# Patient Record
Sex: MALE | Race: WHITE | NOT HISPANIC OR LATINO | URBAN - METROPOLITAN AREA
[De-identification: names, ages, dates, MRNs, and addresses within clinical notes are randomized per-mention and may not be internally consistent; named-entity substitution may affect disease eponyms.]

---

## 2017-07-01 ENCOUNTER — OUTPATIENT (OUTPATIENT)
Dept: OUTPATIENT SERVICES | Facility: HOSPITAL | Age: 62
LOS: 1 days | End: 2017-07-01
Payer: MEDICAID

## 2017-07-17 DIAGNOSIS — R69 ILLNESS, UNSPECIFIED: ICD-10-CM

## 2017-08-01 ENCOUNTER — OUTPATIENT (OUTPATIENT)
Dept: OUTPATIENT SERVICES | Facility: HOSPITAL | Age: 62
LOS: 1 days | End: 2017-08-01

## 2018-02-07 DIAGNOSIS — R69 ILLNESS, UNSPECIFIED: ICD-10-CM

## 2018-05-01 PROCEDURE — G9005: CPT

## 2018-05-01 PROCEDURE — G9001: CPT

## 2018-07-01 ENCOUNTER — OUTPATIENT (OUTPATIENT)
Dept: OUTPATIENT SERVICES | Facility: HOSPITAL | Age: 63
LOS: 1 days | End: 2018-07-01
Payer: MEDICARE

## 2018-07-06 DIAGNOSIS — Z71.89 OTHER SPECIFIED COUNSELING: ICD-10-CM

## 2021-03-01 ENCOUNTER — INPATIENT (INPATIENT)
Facility: HOSPITAL | Age: 66
LOS: 6 days | Discharge: NURSING, REHAB OR LONG-TERM CARE | End: 2021-03-08
Attending: INTERNAL MEDICINE | Admitting: INTERNAL MEDICINE
Payer: MEDICARE

## 2021-03-01 VITALS
TEMPERATURE: 99 F | OXYGEN SATURATION: 99 % | HEART RATE: 130 BPM | DIASTOLIC BLOOD PRESSURE: 51 MMHG | HEIGHT: 72 IN | WEIGHT: 164.91 LBS | RESPIRATION RATE: 25 BRPM | SYSTOLIC BLOOD PRESSURE: 82 MMHG

## 2021-03-01 LAB
ALBUMIN SERPL ELPH-MCNC: 1.8 G/DL — LOW (ref 3.3–5)
ALP SERPL-CCNC: 170 U/L — HIGH (ref 40–120)
ALT FLD-CCNC: 30 U/L — SIGNIFICANT CHANGE UP (ref 12–78)
ANION GAP SERPL CALC-SCNC: 8 MMOL/L — SIGNIFICANT CHANGE UP (ref 5–17)
ANISOCYTOSIS BLD QL: SLIGHT — SIGNIFICANT CHANGE UP
APTT BLD: 20.7 SEC — LOW (ref 27.5–35.5)
AST SERPL-CCNC: 13 U/L — LOW (ref 15–37)
BASOPHILS # BLD AUTO: 0 K/UL — SIGNIFICANT CHANGE UP (ref 0–0.2)
BASOPHILS NFR BLD AUTO: 0 % — SIGNIFICANT CHANGE UP (ref 0–2)
BILIRUB SERPL-MCNC: 1.7 MG/DL — HIGH (ref 0.2–1.2)
BUN SERPL-MCNC: 16 MG/DL — SIGNIFICANT CHANGE UP (ref 7–23)
CALCIUM SERPL-MCNC: 7.3 MG/DL — LOW (ref 8.5–10.1)
CHLORIDE SERPL-SCNC: 94 MMOL/L — LOW (ref 96–108)
CO2 SERPL-SCNC: 35 MMOL/L — HIGH (ref 22–31)
CREAT SERPL-MCNC: 0.76 MG/DL — SIGNIFICANT CHANGE UP (ref 0.5–1.3)
D DIMER BLD IA.RAPID-MCNC: 825 NG/ML DDU — HIGH
ELLIPTOCYTES BLD QL SMEAR: SLIGHT — SIGNIFICANT CHANGE UP
EOSINOPHIL # BLD AUTO: 0 K/UL — SIGNIFICANT CHANGE UP (ref 0–0.5)
EOSINOPHIL NFR BLD AUTO: 0 % — SIGNIFICANT CHANGE UP (ref 0–6)
FLUAV AG NPH QL: SIGNIFICANT CHANGE UP
FLUBV AG NPH QL: SIGNIFICANT CHANGE UP
GLUCOSE SERPL-MCNC: 158 MG/DL — HIGH (ref 70–99)
HCT VFR BLD CALC: 43 % — SIGNIFICANT CHANGE UP (ref 39–50)
HGB BLD-MCNC: 13.3 G/DL — SIGNIFICANT CHANGE UP (ref 13–17)
INR BLD: 1.32 RATIO — HIGH (ref 0.88–1.16)
LACTATE SERPL-SCNC: 2.6 MMOL/L — HIGH (ref 0.7–2)
LYMPHOCYTES # BLD AUTO: 1.31 K/UL — SIGNIFICANT CHANGE UP (ref 1–3.3)
LYMPHOCYTES # BLD AUTO: 5 % — LOW (ref 13–44)
MACROCYTES BLD QL: SIGNIFICANT CHANGE UP
MANUAL SMEAR VERIFICATION: SIGNIFICANT CHANGE UP
MCHC RBC-ENTMCNC: 27.3 PG — SIGNIFICANT CHANGE UP (ref 27–34)
MCHC RBC-ENTMCNC: 30.9 GM/DL — LOW (ref 32–36)
MCV RBC AUTO: 88.3 FL — SIGNIFICANT CHANGE UP (ref 80–100)
MICROCYTES BLD QL: SLIGHT — SIGNIFICANT CHANGE UP
MONOCYTES # BLD AUTO: 0.52 K/UL — SIGNIFICANT CHANGE UP (ref 0–0.9)
MONOCYTES NFR BLD AUTO: 2 % — SIGNIFICANT CHANGE UP (ref 2–14)
NEUTROPHILS # BLD AUTO: 24.33 K/UL — HIGH (ref 1.8–7.4)
NEUTROPHILS NFR BLD AUTO: 88 % — HIGH (ref 43–77)
NEUTS BAND # BLD: 5 % — SIGNIFICANT CHANGE UP (ref 0–8)
NRBC # BLD: 0 /100 — SIGNIFICANT CHANGE UP (ref 0–0)
NRBC # BLD: SIGNIFICANT CHANGE UP /100 WBCS (ref 0–0)
NT-PROBNP SERPL-SCNC: 1428 PG/ML — HIGH (ref 0–125)
OVALOCYTES BLD QL SMEAR: SLIGHT — SIGNIFICANT CHANGE UP
PLAT MORPH BLD: NORMAL — SIGNIFICANT CHANGE UP
PLATELET # BLD AUTO: 180 K/UL — SIGNIFICANT CHANGE UP (ref 150–400)
POTASSIUM SERPL-MCNC: 3 MMOL/L — LOW (ref 3.5–5.3)
POTASSIUM SERPL-SCNC: 3 MMOL/L — LOW (ref 3.5–5.3)
PROT SERPL-MCNC: 4.4 GM/DL — LOW (ref 6–8.3)
PROTHROM AB SERPL-ACNC: 15.1 SEC — HIGH (ref 10.6–13.6)
RBC # BLD: 4.87 M/UL — SIGNIFICANT CHANGE UP (ref 4.2–5.8)
RBC # FLD: 21.2 % — HIGH (ref 10.3–14.5)
RBC BLD AUTO: ABNORMAL
SARS-COV-2 RNA SPEC QL NAA+PROBE: SIGNIFICANT CHANGE UP
SCHISTOCYTES BLD QL AUTO: SLIGHT — SIGNIFICANT CHANGE UP
SODIUM SERPL-SCNC: 137 MMOL/L — SIGNIFICANT CHANGE UP (ref 135–145)
TROPONIN I SERPL-MCNC: 0.03 NG/ML — SIGNIFICANT CHANGE UP (ref 0.01–0.04)
WBC # BLD: 26.16 K/UL — HIGH (ref 3.8–10.5)
WBC # FLD AUTO: 26.16 K/UL — HIGH (ref 3.8–10.5)

## 2021-03-01 PROCEDURE — 71275 CT ANGIOGRAPHY CHEST: CPT | Mod: 26,MC

## 2021-03-01 PROCEDURE — G9005: CPT

## 2021-03-01 PROCEDURE — 71045 X-RAY EXAM CHEST 1 VIEW: CPT | Mod: 26

## 2021-03-01 PROCEDURE — 99285 EMERGENCY DEPT VISIT HI MDM: CPT

## 2021-03-01 PROCEDURE — 76700 US EXAM ABDOM COMPLETE: CPT | Mod: 26

## 2021-03-01 PROCEDURE — 70450 CT HEAD/BRAIN W/O DYE: CPT | Mod: 26,MC

## 2021-03-01 RX ORDER — CHLORHEXIDINE GLUCONATE 213 G/1000ML
1 SOLUTION TOPICAL
Refills: 0 | Status: DISCONTINUED | OUTPATIENT
Start: 2021-03-01 | End: 2021-03-03

## 2021-03-01 RX ORDER — POTASSIUM CHLORIDE 20 MEQ
20 PACKET (EA) ORAL ONCE
Refills: 0 | Status: DISCONTINUED | OUTPATIENT
Start: 2021-03-01 | End: 2021-03-02

## 2021-03-01 RX ORDER — ACETAMINOPHEN 500 MG
975 TABLET ORAL ONCE
Refills: 0 | Status: COMPLETED | OUTPATIENT
Start: 2021-03-01 | End: 2021-03-01

## 2021-03-01 RX ORDER — CEFTRIAXONE 500 MG/1
1000 INJECTION, POWDER, FOR SOLUTION INTRAMUSCULAR; INTRAVENOUS ONCE
Refills: 0 | Status: COMPLETED | OUTPATIENT
Start: 2021-03-01 | End: 2021-03-01

## 2021-03-01 RX ORDER — VANCOMYCIN HCL 1 G
1000 VIAL (EA) INTRAVENOUS EVERY 8 HOURS
Refills: 0 | Status: DISCONTINUED | OUTPATIENT
Start: 2021-03-01 | End: 2021-03-04

## 2021-03-01 RX ORDER — AZITHROMYCIN 500 MG/1
500 TABLET, FILM COATED ORAL ONCE
Refills: 0 | Status: DISCONTINUED | OUTPATIENT
Start: 2021-03-01 | End: 2021-03-02

## 2021-03-01 RX ORDER — SODIUM CHLORIDE 9 MG/ML
1000 INJECTION INTRAMUSCULAR; INTRAVENOUS; SUBCUTANEOUS ONCE
Refills: 0 | Status: COMPLETED | OUTPATIENT
Start: 2021-03-01 | End: 2021-03-01

## 2021-03-01 RX ORDER — ALBUMIN HUMAN 25 %
100 VIAL (ML) INTRAVENOUS ONCE
Refills: 0 | Status: COMPLETED | OUTPATIENT
Start: 2021-03-01 | End: 2021-03-01

## 2021-03-01 RX ORDER — NOREPINEPHRINE BITARTRATE/D5W 8 MG/250ML
0.05 PLASTIC BAG, INJECTION (ML) INTRAVENOUS
Qty: 8 | Refills: 0 | Status: DISCONTINUED | OUTPATIENT
Start: 2021-03-01 | End: 2021-03-03

## 2021-03-01 RX ADMIN — Medication 125 MILLIGRAM(S): at 18:22

## 2021-03-01 RX ADMIN — CEFTRIAXONE 1000 MILLIGRAM(S): 500 INJECTION, POWDER, FOR SOLUTION INTRAMUSCULAR; INTRAVENOUS at 19:27

## 2021-03-01 RX ADMIN — SODIUM CHLORIDE 1000 MILLILITER(S): 9 INJECTION INTRAMUSCULAR; INTRAVENOUS; SUBCUTANEOUS at 19:02

## 2021-03-01 RX ADMIN — CEFTRIAXONE 100 MILLIGRAM(S): 500 INJECTION, POWDER, FOR SOLUTION INTRAMUSCULAR; INTRAVENOUS at 18:57

## 2021-03-01 RX ADMIN — SODIUM CHLORIDE 1000 MILLILITER(S): 9 INJECTION INTRAMUSCULAR; INTRAVENOUS; SUBCUTANEOUS at 18:20

## 2021-03-01 RX ADMIN — SODIUM CHLORIDE 1000 MILLILITER(S): 9 INJECTION INTRAMUSCULAR; INTRAVENOUS; SUBCUTANEOUS at 20:02

## 2021-03-01 RX ADMIN — Medication 7.01 MICROGRAM(S)/KG/MIN: at 19:43

## 2021-03-01 RX ADMIN — Medication 975 MILLIGRAM(S): at 18:20

## 2021-03-01 RX ADMIN — SODIUM CHLORIDE 1000 MILLILITER(S): 9 INJECTION INTRAMUSCULAR; INTRAVENOUS; SUBCUTANEOUS at 19:20

## 2021-03-01 NOTE — ED ADULT NURSE NOTE - NSIMPLEMENTINTERV_GEN_ALL_ED
Implemented All Fall with Harm Risk Interventions:  [place] to call system. Call bell, personal items and telephone within reach. Instruct patient to call for assistance. Room bathroom lighting operational. Non-slip footwear when patient is off stretcher. Physically safe environment: no spills, clutter or unnecessary equipment. Stretcher in lowest position, wheels locked, appropriate side rails in place. Provide visual cue, wrist band, yellow gown, etc. Monitor gait and stability. Monitor for mental status changes and reorient to person, place, and time. Review medications for side effects contributing to fall risk. Reinforce activity limits and safety measures with patient and family. Provide visual clues: red socks.

## 2021-03-01 NOTE — ED PROVIDER NOTE - CARE PLAN
Principal Discharge DX:	Sepsis, due to unspecified organism, unspecified whether acute organ dysfunction present   Principal Discharge DX:	Sepsis, due to unspecified organism, unspecified whether acute organ dysfunction present  Secondary Diagnosis:	Cholecystitis  Secondary Diagnosis:	Pneumonia of right lung due to infectious organism, unspecified part of lung  Secondary Diagnosis:	Urinary retention

## 2021-03-01 NOTE — ED PROVIDER NOTE - OBJECTIVE STATEMENT
64 y/o M with a PMHx of CHF, COPD, HLD and HTN was BIBEMS to the ED from Nursing home s/p syncope episode today. Per EMS, Nursing home staff stated that Patient synopsized and was experiencing agonal breathing. CO2 was noted to be 70 and BP was 50/40 at that time. Nursing home administered albuterol. On EMS stated that Patient's O2 sat ws 92% on CPAP and they did not administer any medication. In the ED, Patient is unsure what happened and does not remember passing out. Denies chest pain, abdominal pain, nausea, emesis, diarrhea, fever, COVID exposure or recent COVID vaccine.     PMH: as listed. No PSH. NKDA. 66 y/o M with a PMHx of CHF, COPD, HLD and HTN was BIBEMS to the ED from Nursing home s/p syncope episode today. Per EMS, Nursing home staff stated that Patient synopsized and was experiencing agonal breathing. SPO2 was noted to be 70 and BP was 50/40 at that time. Nursing home administered albuterol. On EMS stated that Patient's O2 sat ws 92% on CPAP and they did not administer any medication. In the ED, Patient is unsure what happened and does not remember passing out. Denies chest pain, abdominal pain, nausea, emesis, diarrhea, fever, COVID exposure or recent COVID vaccine.     PMH: as listed. No PSH. NKDA.

## 2021-03-01 NOTE — ED ADULT TRIAGE NOTE - CHIEF COMPLAINT QUOTE
pt BIBA with c/o SOB at nursing home and hypotensive in the field as per [facility] facility covid -, placed on CPAP in the field now saturation 99% +Rhonchi

## 2021-03-01 NOTE — ED PROVIDER NOTE - PROGRESS NOTE DETAILS
Pt requesting meal, took off BiPAP to eat, SPO2 90% RA. BP 60/30, pt resting comfortably, will start pressors. Sleeping, SPO2 98% on 4L NC. /60, attempting to wean pressors. kenn - sign out from dr garcia. sepsis, pna, cholecystitis. abx, russell. case dw surgery and icu. admit.

## 2021-03-01 NOTE — ED PROVIDER NOTE - PMH
CHF (congestive heart failure)    COPD (chronic obstructive pulmonary disease)    HLD (hyperlipidemia)    HTN (hypertension)

## 2021-03-01 NOTE — ED PROVIDER NOTE - SECONDARY DIAGNOSIS.
Urinary retention Pneumonia of right lung due to infectious organism, unspecified part of lung Cholecystitis

## 2021-03-01 NOTE — ED PROVIDER NOTE - CLINICAL SUMMARY MEDICAL DECISION MAKING FREE TEXT BOX
64 y/o M with a PMHx of HTN, COPD, CHF and HLD was BIBEMS from nursing home due to syncope and hypoxia. Patient is awake and alert on ED arrival. Vital sings WLN. Plan: place Patient on BiPAP, obtain CBC, CMP, Troponin, BMP, D-dimer, CT brain, CT angio chest, Coagulant panel, lactacid ECG, CXR, blood culture and reassess. 66 y/o M with a PMHx of HTN, COPD, CHF and HLD was BIBEMS from nursing home due to syncope and hypoxia. Patient is awake and alert on ED arrival. Plan: Place pt on BiPAP, obtain CBC, CMP, Troponin, BNP, D-dimer, CT brain, CT angio chest, Coag panel, lactate, ECG, CXR, blood cultures, UA/C and re-eval. W/u significant for: Leukocytosis to 26, lactate 2.6, COVID negative. D-dimer 825, BNP 1248. Pt requiring pressors d/t BP persistently 60/30.

## 2021-03-01 NOTE — ED ADULT NURSE NOTE - CHIEF COMPLAINT QUOTE
pt BIBA with c/o SOB at nursing home and hypotensive in the field as per [facility] facility covid -, placed on CPAP in the field now saturation 99% +Rhonchi

## 2021-03-01 NOTE — ED PROVIDER NOTE - PHYSICAL EXAMINATION
GEN: Awake, alert, interactive, NAD. Appears older than stated age.   HEAD AND NECK: NC/AT. Airway patent. Neck supple.   EYES:  Clear b/l. EOMI. PERRL.   ENT: Moist mucus membranes.   CARDIAC: Regular rate, regular rhythm. No evident pedal edema. No lower extremities edema.   RESP/CHEST: Normal respiratory effort with no use of accessory muscles or retractions. Clear throughout on auscultation. No coarse breath sounds throughout.   ABD: soft, non-distended, non-tender. No rebound, no guarding.   BACK: No midline spinal TTP. No CVAT.   EXTREMITIES: Moving all extremities with no apparent deformities.   SKIN: Warm, dry, intact normal color. No rash.   NEURO: AOx3, CN II-XII grossly intact, no focal deficits.   PSYCH: Appropriate mood and affect. GEN: Awake, alert, interactive, NAD. Appears older than stated age.   HEAD AND NECK: NC/AT. Airway patent. Neck supple.   EYES:  Clear b/l. EOMI. PERRL.   ENT: Moist mucus membranes.   CARDIAC: Regular rate, regular rhythm. No evident pedal edema. No lower extremities edema.   RESP/CHEST: Normal respiratory effort with no use of accessory muscles or retractions. Coarse breath sounds throughout.   ABD: Soft, non-distended, non-tender. No rebound, no guarding.   BACK: No midline spinal TTP. No CVAT.   EXTREMITIES: Moving all extremities with no apparent deformities.   SKIN: Warm, dry, intact normal color. No rash.   NEURO: AOx3, CN II-XII grossly intact, no focal deficits.   PSYCH: Appropriate mood and affect.

## 2021-03-02 LAB
ALBUMIN SERPL ELPH-MCNC: 1.8 G/DL — LOW (ref 3.3–5)
ALBUMIN SERPL ELPH-MCNC: 2.1 G/DL — LOW (ref 3.3–5)
ALBUMIN SERPL ELPH-MCNC: 2.3 G/DL — LOW (ref 3.3–5)
ALP SERPL-CCNC: 105 U/L — SIGNIFICANT CHANGE UP (ref 40–120)
ALP SERPL-CCNC: 134 U/L — HIGH (ref 40–120)
ALP SERPL-CCNC: 152 U/L — HIGH (ref 40–120)
ALT FLD-CCNC: 26 U/L — SIGNIFICANT CHANGE UP (ref 12–78)
ALT FLD-CCNC: 28 U/L — SIGNIFICANT CHANGE UP (ref 12–78)
ALT FLD-CCNC: 31 U/L — SIGNIFICANT CHANGE UP (ref 12–78)
ANION GAP SERPL CALC-SCNC: 4 MMOL/L — LOW (ref 5–17)
ANION GAP SERPL CALC-SCNC: 6 MMOL/L — SIGNIFICANT CHANGE UP (ref 5–17)
ANION GAP SERPL CALC-SCNC: 6 MMOL/L — SIGNIFICANT CHANGE UP (ref 5–17)
APPEARANCE UR: CLEAR — SIGNIFICANT CHANGE UP
AST SERPL-CCNC: 10 U/L — LOW (ref 15–37)
AST SERPL-CCNC: 4 U/L — LOW (ref 15–37)
AST SERPL-CCNC: 7 U/L — LOW (ref 15–37)
BACTERIA # UR AUTO: ABNORMAL
BASOPHILS # BLD AUTO: 0.11 K/UL — SIGNIFICANT CHANGE UP (ref 0–0.2)
BASOPHILS NFR BLD AUTO: 0.4 % — SIGNIFICANT CHANGE UP (ref 0–2)
BILIRUB SERPL-MCNC: 0.8 MG/DL — SIGNIFICANT CHANGE UP (ref 0.2–1.2)
BILIRUB SERPL-MCNC: 0.9 MG/DL — SIGNIFICANT CHANGE UP (ref 0.2–1.2)
BILIRUB SERPL-MCNC: 1 MG/DL — SIGNIFICANT CHANGE UP (ref 0.2–1.2)
BILIRUB UR-MCNC: NEGATIVE — SIGNIFICANT CHANGE UP
BUN SERPL-MCNC: 12 MG/DL — SIGNIFICANT CHANGE UP (ref 7–23)
BUN SERPL-MCNC: 14 MG/DL — SIGNIFICANT CHANGE UP (ref 7–23)
BUN SERPL-MCNC: 15 MG/DL — SIGNIFICANT CHANGE UP (ref 7–23)
CALCIUM SERPL-MCNC: 7.2 MG/DL — LOW (ref 8.5–10.1)
CALCIUM SERPL-MCNC: 7.3 MG/DL — LOW (ref 8.5–10.1)
CALCIUM SERPL-MCNC: 7.4 MG/DL — LOW (ref 8.5–10.1)
CHLORIDE SERPL-SCNC: 100 MMOL/L — SIGNIFICANT CHANGE UP (ref 96–108)
CHLORIDE SERPL-SCNC: 95 MMOL/L — LOW (ref 96–108)
CHLORIDE SERPL-SCNC: 99 MMOL/L — SIGNIFICANT CHANGE UP (ref 96–108)
CO2 SERPL-SCNC: 36 MMOL/L — HIGH (ref 22–31)
CO2 SERPL-SCNC: 36 MMOL/L — HIGH (ref 22–31)
CO2 SERPL-SCNC: 38 MMOL/L — HIGH (ref 22–31)
COLOR SPEC: YELLOW — SIGNIFICANT CHANGE UP
COMMENT - URINE: SIGNIFICANT CHANGE UP
CREAT SERPL-MCNC: 0.4 MG/DL — LOW (ref 0.5–1.3)
CREAT SERPL-MCNC: 0.62 MG/DL — SIGNIFICANT CHANGE UP (ref 0.5–1.3)
CREAT SERPL-MCNC: 0.69 MG/DL — SIGNIFICANT CHANGE UP (ref 0.5–1.3)
DIFF PNL FLD: ABNORMAL
EOSINOPHIL # BLD AUTO: 0 K/UL — SIGNIFICANT CHANGE UP (ref 0–0.5)
EOSINOPHIL NFR BLD AUTO: 0 % — SIGNIFICANT CHANGE UP (ref 0–6)
GGT SERPL-CCNC: 98 U/L — HIGH (ref 9–50)
GLUCOSE SERPL-MCNC: 142 MG/DL — HIGH (ref 70–99)
GLUCOSE SERPL-MCNC: 155 MG/DL — HIGH (ref 70–99)
GLUCOSE SERPL-MCNC: 199 MG/DL — HIGH (ref 70–99)
GLUCOSE UR QL: NEGATIVE MG/DL — SIGNIFICANT CHANGE UP
HCT VFR BLD CALC: 42.8 % — SIGNIFICANT CHANGE UP (ref 39–50)
HGB BLD-MCNC: 13.3 G/DL — SIGNIFICANT CHANGE UP (ref 13–17)
IMM GRANULOCYTES NFR BLD AUTO: 4.8 % — HIGH (ref 0–1.5)
KETONES UR-MCNC: NEGATIVE — SIGNIFICANT CHANGE UP
LACTATE SERPL-SCNC: 1.9 MMOL/L — SIGNIFICANT CHANGE UP (ref 0.7–2)
LEGIONELLA AG UR QL: NEGATIVE — SIGNIFICANT CHANGE UP
LEUKOCYTE ESTERASE UR-ACNC: NEGATIVE — SIGNIFICANT CHANGE UP
LIDOCAIN IGE QN: 32 U/L — LOW (ref 73–393)
LYMPHOCYTES # BLD AUTO: 1.04 K/UL — SIGNIFICANT CHANGE UP (ref 1–3.3)
LYMPHOCYTES # BLD AUTO: 4.1 % — LOW (ref 13–44)
MAGNESIUM SERPL-MCNC: 1.1 MG/DL — LOW (ref 1.6–2.6)
MAGNESIUM SERPL-MCNC: 2 MG/DL — SIGNIFICANT CHANGE UP (ref 1.6–2.6)
MAGNESIUM SERPL-MCNC: 2.1 MG/DL — SIGNIFICANT CHANGE UP (ref 1.6–2.6)
MCHC RBC-ENTMCNC: 27.9 PG — SIGNIFICANT CHANGE UP (ref 27–34)
MCHC RBC-ENTMCNC: 31.1 GM/DL — LOW (ref 32–36)
MCV RBC AUTO: 89.7 FL — SIGNIFICANT CHANGE UP (ref 80–100)
MONOCYTES # BLD AUTO: 0.27 K/UL — SIGNIFICANT CHANGE UP (ref 0–0.9)
MONOCYTES NFR BLD AUTO: 1.1 % — LOW (ref 2–14)
NEUTROPHILS # BLD AUTO: 22.79 K/UL — HIGH (ref 1.8–7.4)
NEUTROPHILS NFR BLD AUTO: 89.6 % — HIGH (ref 43–77)
NITRITE UR-MCNC: POSITIVE
NRBC # BLD: 0 /100 WBCS — SIGNIFICANT CHANGE UP (ref 0–0)
PH UR: 5 — SIGNIFICANT CHANGE UP (ref 5–8)
PHOSPHATE SERPL-MCNC: 1.3 MG/DL — LOW (ref 2.5–4.5)
PHOSPHATE SERPL-MCNC: 3 MG/DL — SIGNIFICANT CHANGE UP (ref 2.5–4.5)
PHOSPHATE SERPL-MCNC: 3.4 MG/DL — SIGNIFICANT CHANGE UP (ref 2.5–4.5)
PLATELET # BLD AUTO: 233 K/UL — SIGNIFICANT CHANGE UP (ref 150–400)
POTASSIUM SERPL-MCNC: 2.9 MMOL/L — CRITICAL LOW (ref 3.5–5.3)
POTASSIUM SERPL-MCNC: 3 MMOL/L — LOW (ref 3.5–5.3)
POTASSIUM SERPL-MCNC: 3.7 MMOL/L — SIGNIFICANT CHANGE UP (ref 3.5–5.3)
POTASSIUM SERPL-SCNC: 2.9 MMOL/L — CRITICAL LOW (ref 3.5–5.3)
POTASSIUM SERPL-SCNC: 3 MMOL/L — LOW (ref 3.5–5.3)
POTASSIUM SERPL-SCNC: 3.7 MMOL/L — SIGNIFICANT CHANGE UP (ref 3.5–5.3)
PROT SERPL-MCNC: 4.4 GM/DL — LOW (ref 6–8.3)
PROT SERPL-MCNC: 4.6 GM/DL — LOW (ref 6–8.3)
PROT SERPL-MCNC: 5.1 GM/DL — LOW (ref 6–8.3)
PROT UR-MCNC: NEGATIVE MG/DL — SIGNIFICANT CHANGE UP
RBC # BLD: 4.77 M/UL — SIGNIFICANT CHANGE UP (ref 4.2–5.8)
RBC # FLD: 20.9 % — HIGH (ref 10.3–14.5)
SODIUM SERPL-SCNC: 139 MMOL/L — SIGNIFICANT CHANGE UP (ref 135–145)
SODIUM SERPL-SCNC: 139 MMOL/L — SIGNIFICANT CHANGE UP (ref 135–145)
SODIUM SERPL-SCNC: 142 MMOL/L — SIGNIFICANT CHANGE UP (ref 135–145)
SP GR SPEC: 1 — LOW (ref 1.01–1.02)
UROBILINOGEN FLD QL: NEGATIVE MG/DL — SIGNIFICANT CHANGE UP
WBC # BLD: 25.42 K/UL — HIGH (ref 3.8–10.5)
WBC # FLD AUTO: 25.42 K/UL — HIGH (ref 3.8–10.5)
WBC UR QL: SIGNIFICANT CHANGE UP

## 2021-03-02 PROCEDURE — 99291 CRITICAL CARE FIRST HOUR: CPT

## 2021-03-02 PROCEDURE — 78226 HEPATOBILIARY SYSTEM IMAGING: CPT | Mod: 26

## 2021-03-02 PROCEDURE — 93010 ELECTROCARDIOGRAM REPORT: CPT

## 2021-03-02 PROCEDURE — 93306 TTE W/DOPPLER COMPLETE: CPT | Mod: 26

## 2021-03-02 RX ORDER — RISPERIDONE 4 MG/1
1 TABLET ORAL
Qty: 0 | Refills: 0 | DISCHARGE

## 2021-03-02 RX ORDER — DEXTROSE MONOHYDRATE, SODIUM CHLORIDE, AND POTASSIUM CHLORIDE 50; .745; 4.5 G/1000ML; G/1000ML; G/1000ML
1000 INJECTION, SOLUTION INTRAVENOUS
Refills: 0 | Status: DISCONTINUED | OUTPATIENT
Start: 2021-03-02 | End: 2021-03-02

## 2021-03-02 RX ORDER — EPINEPHRINE 0.3 MG/.3ML
3 INJECTION INTRAMUSCULAR; SUBCUTANEOUS
Qty: 0 | Refills: 0 | DISCHARGE

## 2021-03-02 RX ORDER — PIPERACILLIN AND TAZOBACTAM 4; .5 G/20ML; G/20ML
3.38 INJECTION, POWDER, LYOPHILIZED, FOR SOLUTION INTRAVENOUS ONCE
Refills: 0 | Status: COMPLETED | OUTPATIENT
Start: 2021-03-02 | End: 2021-03-02

## 2021-03-02 RX ORDER — POTASSIUM CHLORIDE 20 MEQ
40 PACKET (EA) ORAL ONCE
Refills: 0 | Status: COMPLETED | OUTPATIENT
Start: 2021-03-02 | End: 2021-03-02

## 2021-03-02 RX ORDER — HYDROMORPHONE HYDROCHLORIDE 2 MG/ML
1 INJECTION INTRAMUSCULAR; INTRAVENOUS; SUBCUTANEOUS EVERY 4 HOURS
Refills: 0 | Status: DISCONTINUED | OUTPATIENT
Start: 2021-03-02 | End: 2021-03-03

## 2021-03-02 RX ORDER — METRONIDAZOLE 500 MG
500 TABLET ORAL ONCE
Refills: 0 | Status: COMPLETED | OUTPATIENT
Start: 2021-03-02 | End: 2021-03-02

## 2021-03-02 RX ORDER — IPRATROPIUM/ALBUTEROL SULFATE 18-103MCG
3 AEROSOL WITH ADAPTER (GRAM) INHALATION EVERY 6 HOURS
Refills: 0 | Status: DISCONTINUED | OUTPATIENT
Start: 2021-03-02 | End: 2021-03-03

## 2021-03-02 RX ORDER — POTASSIUM CHLORIDE 20 MEQ
10 PACKET (EA) ORAL
Refills: 0 | Status: COMPLETED | OUTPATIENT
Start: 2021-03-02 | End: 2021-03-02

## 2021-03-02 RX ORDER — HYDROCORTISONE 20 MG
50 TABLET ORAL EVERY 12 HOURS
Refills: 0 | Status: DISCONTINUED | OUTPATIENT
Start: 2021-03-02 | End: 2021-03-03

## 2021-03-02 RX ORDER — AZITHROMYCIN 500 MG/1
500 TABLET, FILM COATED ORAL ONCE
Refills: 0 | Status: COMPLETED | OUTPATIENT
Start: 2021-03-02 | End: 2021-03-02

## 2021-03-02 RX ORDER — LACTOBACILLUS ACIDOPHILUS 100MM CELL
1 CAPSULE ORAL DAILY
Refills: 0 | Status: DISCONTINUED | OUTPATIENT
Start: 2021-03-02 | End: 2021-03-08

## 2021-03-02 RX ORDER — CEFEPIME 1 G/1
2000 INJECTION, POWDER, FOR SOLUTION INTRAMUSCULAR; INTRAVENOUS EVERY 8 HOURS
Refills: 0 | Status: DISCONTINUED | OUTPATIENT
Start: 2021-03-02 | End: 2021-03-07

## 2021-03-02 RX ORDER — METRONIDAZOLE 500 MG
500 TABLET ORAL EVERY 8 HOURS
Refills: 0 | Status: DISCONTINUED | OUTPATIENT
Start: 2021-03-02 | End: 2021-03-02

## 2021-03-02 RX ORDER — POTASSIUM CHLORIDE 20 MEQ
10 PACKET (EA) ORAL ONCE
Refills: 0 | Status: DISCONTINUED | OUTPATIENT
Start: 2021-03-02 | End: 2021-03-02

## 2021-03-02 RX ORDER — AZITHROMYCIN 500 MG/1
TABLET, FILM COATED ORAL
Refills: 0 | Status: DISCONTINUED | OUTPATIENT
Start: 2021-03-02 | End: 2021-03-02

## 2021-03-02 RX ORDER — ENOXAPARIN SODIUM 100 MG/ML
40 INJECTION SUBCUTANEOUS DAILY
Refills: 0 | Status: DISCONTINUED | OUTPATIENT
Start: 2021-03-02 | End: 2021-03-08

## 2021-03-02 RX ORDER — KETOROLAC TROMETHAMINE 30 MG/ML
30 SYRINGE (ML) INJECTION EVERY 6 HOURS
Refills: 0 | Status: DISCONTINUED | OUTPATIENT
Start: 2021-03-02 | End: 2021-03-03

## 2021-03-02 RX ORDER — DIPHENHYDRAMINE HCL 50 MG
25 CAPSULE ORAL ONCE
Refills: 0 | Status: COMPLETED | OUTPATIENT
Start: 2021-03-02 | End: 2021-03-02

## 2021-03-02 RX ORDER — SODIUM,POTASSIUM PHOSPHATES 278-250MG
2 POWDER IN PACKET (EA) ORAL
Refills: 0 | Status: COMPLETED | OUTPATIENT
Start: 2021-03-02 | End: 2021-03-04

## 2021-03-02 RX ORDER — ONDANSETRON 8 MG/1
4 TABLET, FILM COATED ORAL EVERY 6 HOURS
Refills: 0 | Status: DISCONTINUED | OUTPATIENT
Start: 2021-03-02 | End: 2021-03-08

## 2021-03-02 RX ORDER — PANTOPRAZOLE SODIUM 20 MG/1
40 TABLET, DELAYED RELEASE ORAL DAILY
Refills: 0 | Status: DISCONTINUED | OUTPATIENT
Start: 2021-03-02 | End: 2021-03-08

## 2021-03-02 RX ORDER — METRONIDAZOLE 500 MG
TABLET ORAL
Refills: 0 | Status: DISCONTINUED | OUTPATIENT
Start: 2021-03-02 | End: 2021-03-02

## 2021-03-02 RX ORDER — HYDROCORTISONE 20 MG
1 TABLET ORAL
Qty: 0 | Refills: 0 | DISCHARGE

## 2021-03-02 RX ORDER — IPRATROPIUM/ALBUTEROL SULFATE 18-103MCG
3 AEROSOL WITH ADAPTER (GRAM) INHALATION ONCE
Refills: 0 | Status: COMPLETED | OUTPATIENT
Start: 2021-03-02 | End: 2021-03-02

## 2021-03-02 RX ORDER — FLUTICASONE PROPIONATE AND SALMETEROL 50; 250 UG/1; UG/1
1 POWDER ORAL; RESPIRATORY (INHALATION)
Qty: 0 | Refills: 0 | DISCHARGE

## 2021-03-02 RX ORDER — ATORVASTATIN CALCIUM 80 MG/1
80 TABLET, FILM COATED ORAL AT BEDTIME
Refills: 0 | Status: DISCONTINUED | OUTPATIENT
Start: 2021-03-02 | End: 2021-03-08

## 2021-03-02 RX ORDER — CALCIUM CARBONATE 500(1250)
1 TABLET ORAL
Qty: 0 | Refills: 0 | DISCHARGE

## 2021-03-02 RX ORDER — ALBUTEROL 90 UG/1
2.5 AEROSOL, METERED ORAL ONCE
Refills: 0 | Status: DISCONTINUED | OUTPATIENT
Start: 2021-03-02 | End: 2021-03-02

## 2021-03-02 RX ORDER — MAGNESIUM SULFATE 500 MG/ML
2 VIAL (ML) INJECTION
Refills: 0 | Status: COMPLETED | OUTPATIENT
Start: 2021-03-02 | End: 2021-03-02

## 2021-03-02 RX ORDER — CEFEPIME 1 G/1
2000 INJECTION, POWDER, FOR SOLUTION INTRAMUSCULAR; INTRAVENOUS ONCE
Refills: 0 | Status: COMPLETED | OUTPATIENT
Start: 2021-03-02 | End: 2021-03-02

## 2021-03-02 RX ORDER — CEFEPIME 1 G/1
INJECTION, POWDER, FOR SOLUTION INTRAMUSCULAR; INTRAVENOUS
Refills: 0 | Status: DISCONTINUED | OUTPATIENT
Start: 2021-03-02 | End: 2021-03-07

## 2021-03-02 RX ORDER — POTASSIUM PHOSPHATE, MONOBASIC POTASSIUM PHOSPHATE, DIBASIC 236; 224 MG/ML; MG/ML
15 INJECTION, SOLUTION INTRAVENOUS ONCE
Refills: 0 | Status: COMPLETED | OUTPATIENT
Start: 2021-03-02 | End: 2021-03-02

## 2021-03-02 RX ORDER — MIDODRINE HYDROCHLORIDE 2.5 MG/1
10 TABLET ORAL EVERY 8 HOURS
Refills: 0 | Status: DISCONTINUED | OUTPATIENT
Start: 2021-03-02 | End: 2021-03-08

## 2021-03-02 RX ORDER — ATORVASTATIN CALCIUM 80 MG/1
1 TABLET, FILM COATED ORAL
Qty: 0 | Refills: 0 | DISCHARGE

## 2021-03-02 RX ADMIN — MIDODRINE HYDROCHLORIDE 10 MILLIGRAM(S): 2.5 TABLET ORAL at 21:57

## 2021-03-02 RX ADMIN — Medication 50 MILLIGRAM(S): at 05:40

## 2021-03-02 RX ADMIN — Medication 2 TABLET(S): at 17:00

## 2021-03-02 RX ADMIN — DEXTROSE MONOHYDRATE, SODIUM CHLORIDE, AND POTASSIUM CHLORIDE 60 MILLILITER(S): 50; .745; 4.5 INJECTION, SOLUTION INTRAVENOUS at 06:00

## 2021-03-02 RX ADMIN — Medication 100 MILLIEQUIVALENT(S): at 06:02

## 2021-03-02 RX ADMIN — Medication 100 MILLIGRAM(S): at 06:38

## 2021-03-02 RX ADMIN — Medication 250 MILLIGRAM(S): at 08:43

## 2021-03-02 RX ADMIN — Medication 100 MILLIEQUIVALENT(S): at 07:04

## 2021-03-02 RX ADMIN — Medication 50 MILLIGRAM(S): at 17:00

## 2021-03-02 RX ADMIN — ENOXAPARIN SODIUM 40 MILLIGRAM(S): 100 INJECTION SUBCUTANEOUS at 13:54

## 2021-03-02 RX ADMIN — Medication 250 MILLIGRAM(S): at 16:11

## 2021-03-02 RX ADMIN — Medication 100 MILLIEQUIVALENT(S): at 10:21

## 2021-03-02 RX ADMIN — Medication 50 GRAM(S): at 04:11

## 2021-03-02 RX ADMIN — Medication 250 MILLIGRAM(S): at 21:57

## 2021-03-02 RX ADMIN — PANTOPRAZOLE SODIUM 40 MILLIGRAM(S): 20 TABLET, DELAYED RELEASE ORAL at 13:56

## 2021-03-02 RX ADMIN — CHLORHEXIDINE GLUCONATE 1 APPLICATION(S): 213 SOLUTION TOPICAL at 13:56

## 2021-03-02 RX ADMIN — AZITHROMYCIN 255 MILLIGRAM(S): 500 TABLET, FILM COATED ORAL at 02:36

## 2021-03-02 RX ADMIN — POTASSIUM PHOSPHATE, MONOBASIC POTASSIUM PHOSPHATE, DIBASIC 62.5 MILLIMOLE(S): 236; 224 INJECTION, SOLUTION INTRAVENOUS at 17:00

## 2021-03-02 RX ADMIN — Medication 100 MILLIEQUIVALENT(S): at 12:53

## 2021-03-02 RX ADMIN — Medication 40 MILLIEQUIVALENT(S): at 08:50

## 2021-03-02 RX ADMIN — Medication 100 MILLIEQUIVALENT(S): at 04:55

## 2021-03-02 RX ADMIN — CEFEPIME 100 MILLIGRAM(S): 1 INJECTION, POWDER, FOR SOLUTION INTRAMUSCULAR; INTRAVENOUS at 21:57

## 2021-03-02 RX ADMIN — CEFEPIME 100 MILLIGRAM(S): 1 INJECTION, POWDER, FOR SOLUTION INTRAMUSCULAR; INTRAVENOUS at 06:01

## 2021-03-02 RX ADMIN — PIPERACILLIN AND TAZOBACTAM 200 GRAM(S): 4; .5 INJECTION, POWDER, LYOPHILIZED, FOR SOLUTION INTRAVENOUS at 04:10

## 2021-03-02 RX ADMIN — Medication 50 MILLILITER(S): at 00:38

## 2021-03-02 RX ADMIN — Medication 3 MILLILITER(S): at 05:26

## 2021-03-02 RX ADMIN — Medication 7.01 MICROGRAM(S)/KG/MIN: at 10:48

## 2021-03-02 RX ADMIN — Medication 25 MILLIGRAM(S): at 05:40

## 2021-03-02 RX ADMIN — CEFEPIME 100 MILLIGRAM(S): 1 INJECTION, POWDER, FOR SOLUTION INTRAMUSCULAR; INTRAVENOUS at 13:54

## 2021-03-02 RX ADMIN — Medication 50 GRAM(S): at 05:05

## 2021-03-02 RX ADMIN — ATORVASTATIN CALCIUM 80 MILLIGRAM(S): 80 TABLET, FILM COATED ORAL at 21:57

## 2021-03-02 RX ADMIN — Medication 100 MILLIEQUIVALENT(S): at 08:43

## 2021-03-02 NOTE — H&P ADULT - ATTENDING COMMENTS
65M PMH CAD, HFpEF,  HLD, HTN, COPD, Nicotine dependence , GERD, crohn's s/p colectomy, prior alcohol use, ? psychiatry d/o was BIBEMS s/p syncopal episode today with agonal breathing at nursing home. Per EMS pt at nursing home was hypoxic to the 70s and hypotensive BP was 50/40. Per nursing home documentation pt had just completed a course of moxifloxacin X  5 days and was prescribed meropenum and vancomycin on the day of admission. Pt in ED febrile to 101 with complaints of chills. WBC 67/35. s/p 2L IVF bolus and albumin without improvement in BP. Started on levophed for hemodynamic support. Lactate 2.6. Admit to ICU for severe sepsis with septic shock, PNA, hypoxia.     1. NEURO  - awake, alert, oriented    2. CV  - septic shock likely due to underlying PNA  - cont with levophed for BP support  - weaning down levophed dose  - can add midodrine in attempt to help wean off IV pressors  - on stress dose steroids as pt ?chronically on steroids for underlying crohn's/COPD    3. PULM  - HCAP: cover for nosocomial infections as pt is from nursing facility  - cont with vanco and cefepime  - check sputum cx if able to expectorate and provide sputum sample  - check MRSA nares (if negative can d/c vanco as long as cultures also without staph)  - o2 supplementation  - CTA chest without PE, + R infiltrate  - duonebs as needed for underlying COPD    4. GI  - noted with GB wall edema and pericholecystic fluid: abdominal exam however benign without any tenderness or Roche's sign  - lab values also don't really reflect GB process severe enough to cause sepsis: mild transaminitis, with initial mild bump in T bili which has normalized  - will obtain HIDA to assess for acute shefali, if negative will start PO diet  - surgery on board    5. ID  - sepsis likely due to pulmonary source  - check blood cx, check u cx  - if able to check sputum cx  - check urine legionella  - cont vanco/cefepime covering HCAP, gram negative organisms  - if MRSA nares negative and cultures negative for staph will d/c vanco  - on flagyl covering possible abdominal source infection though low on ddx, if HIDA negative will d/c flagyl  - pt had reaction to zosyn: skin flushing, SOB: treated with benadryl with improvement  - tolerating cefepime    6. GEN  - monitor in ICU while on vasopressor  - physical therapy    Critical Care Time: 45 min

## 2021-03-02 NOTE — H&P ADULT - ASSESSMENT
Care After Your Injection  Dimitrios Mcqueen MD  [phone] / [phone]    Procedure    Lumbar Epidural Steroid Injection    Activity  • Do Not work, stay alone, drive a car, operate machinery or electrical equipment (including kitchen appliances) today.   • Activity as tolerated today.  • Resume your pre-procedure activity or restrictions tomorrow.     Dressing/Injection Site:  • Keep injection site clean and dry.   • You may use an ice pack on and off over the injection site for the next 24 hours while awake.    Bathing  • You may shower/bathe tomorrow.    Diet  • Resume your pre-procedure diet.      Medication  • Continue home medications, unless otherwise instructed.    Special Instructions  • DO NOT DRINK ALCOHOL TODAY due to the medication given during the procedure.    Call Dr. Mcqueen [place] office at [phone] or [phone] if you have the following:  • Drainage, increase in redness and/or swelling from the injection site.  • Temperature above 101 degrees.   • Numbness or weakness of your legs or arms different than before the injection.  • Severe headache.    Follow -up Instructions:    You have been scheduled for a follow up appointment with CRIS Patricio on September 20th at 1:00 p.m. at [place] [address] Location - [address] [place] [address] on [facility]    • For patients who had Epidural Steroid Injections: If within a week of the injection you notice significant relief, but not complete relief of pain, a second injection may be scheduled.  The second injection may be scheduled approximately 1 month after the first one.      Patient and family verbalize discharge instructions prior to procedure with nurse.    ___________________________________________________________________  Patient/Responsible Party Signature           RN Signature                   Date    
Assessment   ========    64 y/o M with a PMHx of CHF, COPD, HLD and HTN was BIBEMS to the ED from Nursing home s/p syncope episode today.   In ER patient remained hypotensive, started on vasopressor agents.   US showed gallbladder with wall thickening concerning for cholecystitis     Plan:  =====    ##Neuro:   -neuro check per routine    ##Cardiac  - hold hypertensive medications   - continue levophed titrate for MAP> 65      ##Pulm  -COPD  -continue albuterol/ atrovent MDI     -CT Chest R lower lobe infiltrate  - continue ABX for possible CAP  -check MRSA PRC  -urine for legionella  -continue O2 supplementation       ##GI  -US --> shows Gallbladder wall thickening  - HIDA  - Sx Consult   - keep NPO      ##Renal  -maintain urine output > .5cc/ kg   -monitor I&O     ##Gu   -maintain russell     ##Endo  - monitor finger sticks Q6 while NPO   -sliding scale insulin     ##ID  - trend lactate   - continue broad spectrum ABX -  - Pan culture - pending   - S/P 2 L fluid resuscitation     ##MUSC  - PT/OT consult

## 2021-03-02 NOTE — CONSULT NOTE ADULT - SUBJECTIVE AND OBJECTIVE BOX
Chief Complaint:  Patient is a 65y old  Male who presents with a chief complaint of difficulty breathing.     HPI: 64 y/o M PMH CHF, COPD, HTN, HLD, Crohns, BIBEMS s/p syncope, respiratory distress. Pt reportedly was saturating at 70%, then placed on BIPAP, with improvement in breathing and saturations. Patient found to be hypotensive in ED, s/p bolus x 2, albumin. Started on pressors.     Surgery consulted for US findings suggesting acute cholecystitis.   Patient seen and examined at bedside in ED.  Levophed running. BP and HR stable.   Patient sleeping, easily arousable.   Pt reports difficulty breathing since this morning at his nursing home. Then the next memory he has he was in the ER.  Patient denies abdominal pain, nausea, vomiting. Reports his last BM was this morning and was "my usual diarrhea from my Crohns."  Pt has hx of CHF. COPD, HTN, HLD, Crohns w/ hx of large bowel resection >30 yrs ago, hx of umbilical hernia repair.     PMH/PSH:PAST MEDICAL & SURGICAL HISTORY:  HLD (hyperlipidemia)  CHF (congestive heart failure)  COPD (chronic obstructive pulmonary disease)  HTN (hypertension)  Crohns (resection)  No significant past surgical history        Allergies:  No Known Allergies      Medications:  azithromycin  IVPB      azithromycin  IVPB 500 milliGRAM(s) IV Intermittent once  chlorhexidine 4% Liquid 1 Application(s) Topical <User Schedule>  enoxaparin Injectable 40 milliGRAM(s) SubCutaneous daily  norepinephrine Infusion 0.05 MICROgram(s)/kG/Min IV Continuous <Continuous>  piperacillin/tazobactam IVPB. 3.375 Gram(s) IV Intermittent Once  potassium chloride  20 mEq/100 mL IVPB 20 milliEquivalent(s) IV Intermittent once  vancomycin  IVPB 1000 milliGRAM(s) IV Intermittent every 8 hours      REVIEW OF SYSTEMS:  All other review of systems is negative unless indicated above.    Relevant Family History:   FAMILY HISTORY:      Relevant Social History:  Denies ETOH or tobacco history    Physical Exam:    Vital Signs:  Vital Signs Last 24 Hrs  T(C): 36.8 (02 Mar 2021 00:33), Max: 38.3 (01 Mar 2021 17:41)  T(F): 98.3 (02 Mar 2021 00:33), Max: 101 (01 Mar 2021 17:41)  HR: 90 (02 Mar 2021 00:33) (86 - 130)  BP: 103/60 (02 Mar 2021 00:33) (67/35 - 112/67)  RR: 16 (02 Mar 2021 00:33) (14 - 25)  SpO2: 98% (02 Mar 2021 00:33) (95% - 99%)      Constitutional: Sleeping supine in bed comfortably   HEENT: NC/AT, PERRL, anicteric sclera  Respiratory: 6L NC; respirations nonlabored   Cardiac: RRR   Gastrointestinal: old well-healed scar to LLQ, soft, nondistended, no tenderness to palpation, no rebound or guarding, -Roche's   Skin: warm, dry and intact; no rashes, wounds, or scars  Neurologic: AAOx3; CNS grossly intact    Laboratory:             13.3   26.16 )-----------( 180      ( 01 Mar 2021 17:37 )             43.0     03-01    137  |  94<L>  |  16  ----------------------------<  158<H>  3.0<L>   |  35<H>  |  0.76    Ca    7.3<L>      01 Mar 2021 17:37    TPro  4.4<L>  /  Alb  1.8<L>  /  TBili  1.7<H>  /  DBili  x   /  AST  13<L>  /  ALT  30  /  AlkPhos  170<H>  03-01    LIVER FUNCTIONS - ( 01 Mar 2021 17:37 )  Alb: 1.8 g/dL / Pro: 4.4 gm/dL / ALK PHOS: 170 U/L / ALT: 30 U/L / AST: 13 U/L / GGT: x           PT/INR - ( 01 Mar 2021 17:37 )   PT: 15.1 sec;   INR: 1.32 ratio    PTT - ( 01 Mar 2021 17:37 )  PTT:20.7 sec    Imaging:    < from: CT Angio Chest w/ IV Cont (03.01.21 @ 22:47) >  EXAM:  CT ANGIO CHEST (W)AW IC                            PROCEDURE DATE:  03/01/2021          INTERPRETATION:  CLINICAL INFORMATION: Syncope and hypoxia    COMPARISON: None.    PROCEDURE:  CT Angiography of the Chest.  90 ml of Omnipaque 350 was injected intravenously. 10 ml were discarded.  Sagittal and coronal reformats were performed as well as 3D (MIP) reconstructions.    FINDINGS:    LUNGS AND AIRWAYS: Patent central airways.  Severe diffuse emphysematous changes. Patchy right upper lobe consolidation. Right lower lobe partial atelectasis, and streaky bibasilar atelectasis.  PLEURA: Trace right pleural effusion.  MEDIASTINUM AND MAXIMILIAN: No lymphadenopathy.  VESSELS: No evidence of pulmonary embolism. No evidence of dissection.  HEART: Mild cardiomegaly, with right atrial and right ventricular enlargement. No pericardial effusion. Coronary artery calcifications.  CHEST WALL AND LOWER NECK: Within normal limits.  VISUALIZED UPPER ABDOMEN: Small hiatal hernia.  BONES: Degenerative changes.    IMPRESSION:  No evidence of pulmonary embolism. No evidence of dissection.    Right upper lobe pneumonia. Severe emphysema.      GEORGINA WORRELL MD; Attending Radiologist  This document has been electronically signed. Mar  1 2021 11:18PM    < end of copied text >      < from: US Abdomen Complete (US Abdomen Complete .) (03.01.21 @ 23:47) >  EXAM:  US ABDOMINAL COMPLETE                            PROCEDURE DATE:  03/01/2021      INTERPRETATION:  CLINICAL INFORMATION: Sepsis. 64 y/o M with a PMHx of CHF, COPD, HLD and HTN was BIBEMS to the ED from Nursing home s/p syncope episode today. Per EMS, Nursing home staff stated that Patient synopsized and was experiencing agonal breathing. SPO2 was noted to be 70 and BP was 50/40 at that time. Nursing home administered albuterol. On EMS stated that Patient's O2 sat ws 92% on CPAP and  they did not administer any medication. In the ED, Patient is unsure what happened and does not remember passing out.    COMPARISON: CT abdomen pelvis from 3/1/2021 10:48 PM    TECHNIQUE: Sonography of the abdomen.    FINDINGS:    Liver: Within normallimits.  Bile ducts: Normal caliber. Common bile duct measures 3.  Gallbladder: Gallstone. Wall thickening and trace pericholecystic fluid. The sonographic Roche's sign was reported.  Pancreas: Visualized portions are within normal limits.  Spleen: 10.8. Within normal limits.  Right kidney: 13.0. Moderate hydronephrosis.  Left kidney: 13.0.  Moderate hydronephrosis.  Ascites: None.  Aorta and IVC: Visualized portions are within normal limits.    IMPRESSION:  Gallstone with wall thickening and pericholecystic fluid which, in the appropriate clinical setting, these is a concern for acute cholecystitis. Consider further evaluation with nuclear medicine DISIDA scan which is more sensitive for the detection of such diagnosis.    Moderate bilateral hydronephrosis which may be related to a distended urinary bladder. Please correlate clinically with urinalysis and urine culture.      GRANT SCHILLING MD; Attending Radiologist  This document has been electronically signed. Mar  1 2021 11:59PM    < end of copied text >   Chief Complaint:  Patient is a 65y old  Male who presents with a chief complaint of difficulty breathing.     HPI: 66 y/o M PMH CHF, COPD, HTN, HLD, Crohns, BIBEMS s/p syncope, respiratory distress. Pt reportedly was saturating at 70%, then placed on BIPAP, with improvement in breathing and saturations. Patient found to be hypotensive in ED, s/p bolus x 2, albumin. Started on pressors.     Surgery consulted for US findings suggesting acute cholecystitis.   Patient seen and examined at bedside in ED.  Levophed running. BP and HR stable.   Patient sleeping, easily arousable.   Pt reports difficulty breathing since this morning at his nursing home. Patient does not recall how he got to ED.   Patient denies abdominal pain, nausea, vomiting. Reports his last BM was this morning and was "my usual diarrhea from my Crohns."  Pt has hx of CHF. COPD, HTN, HLD, Crohns w/ hx of large bowel resection >30 yrs ago, hx of umbilical hernia repair.     PMH/PSH:PAST MEDICAL & SURGICAL HISTORY:  HLD (hyperlipidemia)  CHF (congestive heart failure)  COPD (chronic obstructive pulmonary disease)  HTN (hypertension)  Crohns (resection)  No significant past surgical history        Allergies:  No Known Allergies      Medications:  azithromycin  IVPB      azithromycin  IVPB 500 milliGRAM(s) IV Intermittent once  chlorhexidine 4% Liquid 1 Application(s) Topical <User Schedule>  enoxaparin Injectable 40 milliGRAM(s) SubCutaneous daily  norepinephrine Infusion 0.05 MICROgram(s)/kG/Min IV Continuous <Continuous>  piperacillin/tazobactam IVPB. 3.375 Gram(s) IV Intermittent Once  potassium chloride  20 mEq/100 mL IVPB 20 milliEquivalent(s) IV Intermittent once  vancomycin  IVPB 1000 milliGRAM(s) IV Intermittent every 8 hours      REVIEW OF SYSTEMS:  All other review of systems is negative unless indicated above.    Relevant Family History:   FAMILY HISTORY:      Relevant Social History:  Denies ETOH or tobacco history    Physical Exam:    Vital Signs:  Vital Signs Last 24 Hrs  T(C): 36.8 (02 Mar 2021 00:33), Max: 38.3 (01 Mar 2021 17:41)  T(F): 98.3 (02 Mar 2021 00:33), Max: 101 (01 Mar 2021 17:41)  HR: 90 (02 Mar 2021 00:33) (86 - 130)  BP: 103/60 (02 Mar 2021 00:33) (67/35 - 112/67)  RR: 16 (02 Mar 2021 00:33) (14 - 25)  SpO2: 98% (02 Mar 2021 00:33) (95% - 99%)      Constitutional: Sleeping supine in bed comfortably   HEENT: NC/AT, PERRL, anicteric sclera  Respiratory: 6L NC; respirations nonlabored   Cardiac: RRR   Gastrointestinal: old well-healed scar to LLQ, soft, nondistended, no tenderness to palpation, no rebound or guarding, -Roche's   Skin: warm, dry and intact; no rashes, wounds, or scars  Neurologic: AAOx3; CNS grossly intact    Laboratory:             13.3   26.16 )-----------( 180      ( 01 Mar 2021 17:37 )             43.0     03-01    137  |  94<L>  |  16  ----------------------------<  158<H>  3.0<L>   |  35<H>  |  0.76    Ca    7.3<L>      01 Mar 2021 17:37    TPro  4.4<L>  /  Alb  1.8<L>  /  TBili  1.7<H>  /  DBili  x   /  AST  13<L>  /  ALT  30  /  AlkPhos  170<H>  03-01    LIVER FUNCTIONS - ( 01 Mar 2021 17:37 )  Alb: 1.8 g/dL / Pro: 4.4 gm/dL / ALK PHOS: 170 U/L / ALT: 30 U/L / AST: 13 U/L / GGT: x           PT/INR - ( 01 Mar 2021 17:37 )   PT: 15.1 sec;   INR: 1.32 ratio    PTT - ( 01 Mar 2021 17:37 )  PTT:20.7 sec    Imaging:    < from: CT Angio Chest w/ IV Cont (03.01.21 @ 22:47) >  EXAM:  CT ANGIO CHEST (W)AW IC                            PROCEDURE DATE:  03/01/2021          INTERPRETATION:  CLINICAL INFORMATION: Syncope and hypoxia    COMPARISON: None.    PROCEDURE:  CT Angiography of the Chest.  90 ml of Omnipaque 350 was injected intravenously. 10 ml were discarded.  Sagittal and coronal reformats were performed as well as 3D (MIP) reconstructions.    FINDINGS:    LUNGS AND AIRWAYS: Patent central airways.  Severe diffuse emphysematous changes. Patchy right upper lobe consolidation. Right lower lobe partial atelectasis, and streaky bibasilar atelectasis.  PLEURA: Trace right pleural effusion.  MEDIASTINUM AND MAXIMILIAN: No lymphadenopathy.  VESSELS: No evidence of pulmonary embolism. No evidence of dissection.  HEART: Mild cardiomegaly, with right atrial and right ventricular enlargement. No pericardial effusion. Coronary artery calcifications.  CHEST WALL AND LOWER NECK: Within normal limits.  VISUALIZED UPPER ABDOMEN: Small hiatal hernia.  BONES: Degenerative changes.    IMPRESSION:  No evidence of pulmonary embolism. No evidence of dissection.    Right upper lobe pneumonia. Severe emphysema.      GEORGINA WORRELL MD; Attending Radiologist  This document has been electronically signed. Mar  1 2021 11:18PM    < end of copied text >      < from: US Abdomen Complete (US Abdomen Complete .) (03.01.21 @ 23:47) >  EXAM:  US ABDOMINAL COMPLETE                            PROCEDURE DATE:  03/01/2021      INTERPRETATION:  CLINICAL INFORMATION: Sepsis. 66 y/o M with a PMHx of CHF, COPD, HLD and HTN was BIBEMS to the ED from Nursing home s/p syncope episode today. Per EMS, Nursing home staff stated that Patient synopsized and was experiencing agonal breathing. SPO2 was noted to be 70 and BP was 50/40 at that time. Nursing home administered albuterol. On EMS stated that Patient's O2 sat ws 92% on CPAP and  they did not administer any medication. In the ED, Patient is unsure what happened and does not remember passing out.    COMPARISON: CT abdomen pelvis from 3/1/2021 10:48 PM    TECHNIQUE: Sonography of the abdomen.    FINDINGS:    Liver: Within normallimits.  Bile ducts: Normal caliber. Common bile duct measures 3.  Gallbladder: Gallstone. Wall thickening and trace pericholecystic fluid. The sonographic Roche's sign was reported.  Pancreas: Visualized portions are within normal limits.  Spleen: 10.8. Within normal limits.  Right kidney: 13.0. Moderate hydronephrosis.  Left kidney: 13.0.  Moderate hydronephrosis.  Ascites: None.  Aorta and IVC: Visualized portions are within normal limits.    IMPRESSION:  Gallstone with wall thickening and pericholecystic fluid which, in the appropriate clinical setting, these is a concern for acute cholecystitis. Consider further evaluation with nuclear medicine DISIDA scan which is more sensitive for the detection of such diagnosis.    Moderate bilateral hydronephrosis which may be related to a distended urinary bladder. Please correlate clinically with urinalysis and urine culture.      GRANT SCHILLING MD; Attending Radiologist  This document has been electronically signed. Mar  1 2021 11:59PM    < end of copied text >

## 2021-03-02 NOTE — H&P ADULT - HISTORY OF PRESENT ILLNESS
66 y/o M with a PMHx of CHF, COPD, HLD and HTN was BIBEMS to the ED from Nursing home s/p syncope episode today. Per EMS, Nursing home staff stated that Patient synopsized and was experiencing agonal breathing. SPO2 was noted to be 70 and BP was 50/40 at that time. Nursing home administered albuterol. On EMS stated that Patient's O2 sat ws 92% on CPAP and they did not administer any medication. In the ED, Patient is unsure what happened and does not remember passing out. Denies chest pain, abdominal pain, nausea, emesis, diarrhea, fever, COVID exposure or recent COVID vaccine.     ICU consultation called for refractory hypotension, requiring vasopressor support    64 y/o M with a PMHx of HFPEF, CAD, gerd COPD, Nicotine dependence , chrohn's s/p colectomy, prior alchohol use, ? psychiatry d/o,  HLD and HTN was BIBEMS to the ED from Nursing home s/p syncope episode today. Per EMS, Nursing home staff stated that Patient synopsized and was experiencing agonal breathing. SPO2 was noted to be 70 and BP was 50/40 at that time. Nursing home administered albuterol. On EMS stated that Patient's O2 sat ws 92% on CPAP and they did not administer any medication. In the ED, Patient is unsure what happened and does not remember passing out. Denies chest pain, abdominal pain, nausea, emesis, diarrhea, fever, COVID exposure or recent COVID vaccine.     After review of nursing home chart patient has completed a course of moxifloxicin X  5 days, was prescribed meropenum and vancomycin on the same day as admission .     ICU consultation called for refractory hypotension, requiring vasopressor support

## 2021-03-02 NOTE — CHART NOTE - NSCHARTNOTEFT_GEN_A_CORE
Patient with episode of acute hypoxia, difficulty breathing, skin flushed during administration of IV antibiotics Zosyn .   After close review of patient paperwork from the nursing home, it  appears that the patient has had previous reaction to zosyn.   Allergy status is now updated   Patient treated with duoneb nebulizer, benadryl

## 2021-03-02 NOTE — H&P ADULT - NSICDXPASTMEDICALHX_GEN_ALL_CORE_FT
PAST MEDICAL HISTORY:  CHF (congestive heart failure)     COPD (chronic obstructive pulmonary disease)     HLD (hyperlipidemia)     HTN (hypertension)

## 2021-03-02 NOTE — H&P ADULT - NSHPPHYSICALEXAM_GEN_ALL_CORE
GENERAL: NAD, well-groomed, well-developed  HEAD:  Atraumatic, Normocephalic  EYES: EOMI, PERRLA, conjunctiva and sclera clear  ENMT: No tonsillar erythema, exudates, or enlargement; Moist mucous membranes, Good dentition, No lesions  NECK: Supple, No JVD, Normal thyroid  NERVOUS SYSTEM:  Alert & Oriented X3, Good concentration; Motor Strength 4/5 B/L lower  extremities  CHEST/LUNG: Clear to percussion bilaterally; No rales, rhonchi, wheezing, or rubs  HEART: Regular rate and rhythm; No murmurs, rubs, or gallops  ABDOMEN: Soft, Nondistended; Bowel sounds present, ++ tender left lower   EXTREMITIES:  2+ Peripheral Pulses, No clubbing, cyanosis, or edema  LYMPH: No lymphadenopathy noted  SKIN: No rashes or lesions

## 2021-03-02 NOTE — H&P ADULT - NSHPLABSRESULTS_GEN_ALL_CORE
`  LABS:                          13.3   26.16 )-----------( 180      ( 01 Mar 2021 17:37 )             43.0     03-01    137  |  94<L>  |  16  ----------------------------<  158<H>  3.0<L>   |  35<H>  |  0.76    Ca    7.3<L>      01 Mar 2021 17:37    TPro  4.4<L>  /  Alb  1.8<L>  /  TBili  1.7<H>  /  DBili  x   /  AST  13<L>  /  ALT  30  /  AlkPhos  170<H>  03-01    LIVER FUNCTIONS - ( 01 Mar 2021 17:37 )  Alb: 1.8 g/dL / Pro: 4.4 gm/dL / ALK PHOS: 170 U/L / ALT: 30 U/L / AST: 13 U/L / GGT: x         thy  PT/INR - ( 01 Mar 2021 17:37 )   PT: 15.1 sec;   INR: 1.32 ratio         PTT - ( 01 Mar 2021 17:37 )  PTT:20.7 sec      D-Dimer Assay, Quantitative: 825 ng/mL DDU (03-01-21 @ 17:37)    US Abdomen Complete (US Abdomen Complete .) (03.01.21 @ 23:47) >  IMPRESSION:  Gallstone with wall thickening and pericholecystic fluid which, in the appropriate clinical setting, these is a concern for acute cholecystitis. Consider further evaluation with nuclear medicine DISIDA scan which is more sensitive for the detection of such diagnosis.    Moderate bilateral hydronephrosis which may be related to a distended urinary bladder. Please correlate clinically with urinalysis and urine culture.         CT Angio Chest w/ IV Cont (03.01.21 @ 22:47) >  IMPRESSION:  No evidence of pulmonary embolism. No evidence of dissection.  Right upper lobe pneumonia. Severe emphysema.      CT Angio Chest w/ IV Cont (03.01.21 @ 22:47) >  LUNGS AND AIRWAYS: Patent central airways.  Severe diffuse emphysematous changes. Patchy right upper lobe consolidation. Right lower lobe partial atelectasis, and streaky bibasilar atelectasis.      CT Head No Cont (03.01.21 @ 22:45) >  IMPRESSION:  No acute intracranial hemorrhage or mass effect.

## 2021-03-02 NOTE — H&P ADULT - NSHPREVIEWOFSYSTEMS_GEN_ALL_CORE
REVIEW OF SYSTEMS:    CONSTITUTIONAL: ++syncope   EYES/ENT: No visual changes;  No vertigo or throat pain   NECK: No pain or stiffness  RESPIRATORY: No cough, wheezing, hemoptysis; No shortness of breath  CARDIOVASCULAR: No chest pain or palpitations  GASTROINTESTINAL: + abdominal lower left abdomen denies diarrhea or constipation. No melena or hematochezia.  GENITOURINARY: No dysuria, frequency or hematuria  NEUROLOGICAL: No numbness or weakness  SKIN: No itching, rashes
Routine  care and anticipatory guidance

## 2021-03-03 LAB
ALBUMIN SERPL ELPH-MCNC: 1.9 G/DL — LOW (ref 3.3–5)
ALP SERPL-CCNC: 85 U/L — SIGNIFICANT CHANGE UP (ref 40–120)
ALT FLD-CCNC: 22 U/L — SIGNIFICANT CHANGE UP (ref 12–78)
ANION GAP SERPL CALC-SCNC: 5 MMOL/L — SIGNIFICANT CHANGE UP (ref 5–17)
AST SERPL-CCNC: 4 U/L — LOW (ref 15–37)
BILIRUB SERPL-MCNC: 0.5 MG/DL — SIGNIFICANT CHANGE UP (ref 0.2–1.2)
BLD GP AB SCN SERPL QL: SIGNIFICANT CHANGE UP
BUN SERPL-MCNC: 13 MG/DL — SIGNIFICANT CHANGE UP (ref 7–23)
C DIFF BY PCR RESULT: SIGNIFICANT CHANGE UP
C DIFF TOX GENS STL QL NAA+PROBE: SIGNIFICANT CHANGE UP
CALCIUM SERPL-MCNC: 6.9 MG/DL — LOW (ref 8.5–10.1)
CHLORIDE SERPL-SCNC: 99 MMOL/L — SIGNIFICANT CHANGE UP (ref 96–108)
CO2 SERPL-SCNC: 36 MMOL/L — HIGH (ref 22–31)
CREAT SERPL-MCNC: 0.59 MG/DL — SIGNIFICANT CHANGE UP (ref 0.5–1.3)
CULTURE RESULTS: SIGNIFICANT CHANGE UP
GLUCOSE SERPL-MCNC: 179 MG/DL — HIGH (ref 70–99)
HCT VFR BLD CALC: 35.5 % — LOW (ref 39–50)
HCV AB S/CO SERPL IA: 0.04 S/CO — SIGNIFICANT CHANGE UP (ref 0–0.99)
HCV AB SERPL-IMP: SIGNIFICANT CHANGE UP
HGB BLD-MCNC: 10.9 G/DL — LOW (ref 13–17)
MAGNESIUM SERPL-MCNC: 1.9 MG/DL — SIGNIFICANT CHANGE UP (ref 1.6–2.6)
MCHC RBC-ENTMCNC: 27.4 PG — SIGNIFICANT CHANGE UP (ref 27–34)
MCHC RBC-ENTMCNC: 30.7 GM/DL — LOW (ref 32–36)
MCV RBC AUTO: 89.2 FL — SIGNIFICANT CHANGE UP (ref 80–100)
MRSA PCR RESULT.: SIGNIFICANT CHANGE UP
NRBC # BLD: 0 /100 WBCS — SIGNIFICANT CHANGE UP (ref 0–0)
PHOSPHATE SERPL-MCNC: 2 MG/DL — LOW (ref 2.5–4.5)
PLATELET # BLD AUTO: 135 K/UL — LOW (ref 150–400)
POTASSIUM SERPL-MCNC: 3.2 MMOL/L — LOW (ref 3.5–5.3)
POTASSIUM SERPL-SCNC: 3.2 MMOL/L — LOW (ref 3.5–5.3)
PROCALCITONIN SERPL-MCNC: 24.1 NG/ML — HIGH (ref 0.02–0.1)
PROT SERPL-MCNC: 4.2 GM/DL — LOW (ref 6–8.3)
RBC # BLD: 3.98 M/UL — LOW (ref 4.2–5.8)
RBC # FLD: 20.8 % — HIGH (ref 10.3–14.5)
S AUREUS DNA NOSE QL NAA+PROBE: SIGNIFICANT CHANGE UP
SODIUM SERPL-SCNC: 140 MMOL/L — SIGNIFICANT CHANGE UP (ref 135–145)
SPECIMEN SOURCE: SIGNIFICANT CHANGE UP
VANCOMYCIN TROUGH SERPL-MCNC: 18.8 UG/ML — SIGNIFICANT CHANGE UP (ref 10–20)
WBC # BLD: 12.82 K/UL — HIGH (ref 3.8–10.5)
WBC # FLD AUTO: 12.82 K/UL — HIGH (ref 3.8–10.5)

## 2021-03-03 PROCEDURE — 99233 SBSQ HOSP IP/OBS HIGH 50: CPT

## 2021-03-03 RX ORDER — BUDESONIDE AND FORMOTEROL FUMARATE DIHYDRATE 160; 4.5 UG/1; UG/1
2 AEROSOL RESPIRATORY (INHALATION)
Refills: 0 | Status: DISCONTINUED | OUTPATIENT
Start: 2021-03-03 | End: 2021-03-08

## 2021-03-03 RX ORDER — POTASSIUM PHOSPHATE, MONOBASIC POTASSIUM PHOSPHATE, DIBASIC 236; 224 MG/ML; MG/ML
15 INJECTION, SOLUTION INTRAVENOUS ONCE
Refills: 0 | Status: COMPLETED | OUTPATIENT
Start: 2021-03-03 | End: 2021-03-03

## 2021-03-03 RX ORDER — RISPERIDONE 4 MG/1
1 TABLET ORAL
Refills: 0 | Status: DISCONTINUED | OUTPATIENT
Start: 2021-03-03 | End: 2021-03-08

## 2021-03-03 RX ORDER — IPRATROPIUM/ALBUTEROL SULFATE 18-103MCG
3 AEROSOL WITH ADAPTER (GRAM) INHALATION EVERY 6 HOURS
Refills: 0 | Status: DISCONTINUED | OUTPATIENT
Start: 2021-03-03 | End: 2021-03-08

## 2021-03-03 RX ORDER — HYDROCORTISONE 20 MG
10 TABLET ORAL
Refills: 0 | Status: DISCONTINUED | OUTPATIENT
Start: 2021-03-03 | End: 2021-03-08

## 2021-03-03 RX ORDER — POTASSIUM CHLORIDE 20 MEQ
40 PACKET (EA) ORAL EVERY 4 HOURS
Refills: 0 | Status: COMPLETED | OUTPATIENT
Start: 2021-03-03 | End: 2021-03-03

## 2021-03-03 RX ADMIN — Medication 40 MILLIEQUIVALENT(S): at 09:33

## 2021-03-03 RX ADMIN — ATORVASTATIN CALCIUM 80 MILLIGRAM(S): 80 TABLET, FILM COATED ORAL at 21:08

## 2021-03-03 RX ADMIN — BUDESONIDE AND FORMOTEROL FUMARATE DIHYDRATE 2 PUFF(S): 160; 4.5 AEROSOL RESPIRATORY (INHALATION) at 17:33

## 2021-03-03 RX ADMIN — PANTOPRAZOLE SODIUM 40 MILLIGRAM(S): 20 TABLET, DELAYED RELEASE ORAL at 11:11

## 2021-03-03 RX ADMIN — Medication 2 TABLET(S): at 06:12

## 2021-03-03 RX ADMIN — Medication 2 TABLET(S): at 17:33

## 2021-03-03 RX ADMIN — Medication 50 MILLIGRAM(S): at 06:12

## 2021-03-03 RX ADMIN — Medication 3 MILLILITER(S): at 01:48

## 2021-03-03 RX ADMIN — ENOXAPARIN SODIUM 40 MILLIGRAM(S): 100 INJECTION SUBCUTANEOUS at 11:11

## 2021-03-03 RX ADMIN — CEFEPIME 100 MILLIGRAM(S): 1 INJECTION, POWDER, FOR SOLUTION INTRAMUSCULAR; INTRAVENOUS at 13:50

## 2021-03-03 RX ADMIN — Medication 30 MILLIGRAM(S): at 11:25

## 2021-03-03 RX ADMIN — MIDODRINE HYDROCHLORIDE 10 MILLIGRAM(S): 2.5 TABLET ORAL at 21:42

## 2021-03-03 RX ADMIN — Medication 30 MILLIGRAM(S): at 11:11

## 2021-03-03 RX ADMIN — Medication 3 MILLILITER(S): at 17:33

## 2021-03-03 RX ADMIN — Medication 10 MILLIGRAM(S): at 17:33

## 2021-03-03 RX ADMIN — RISPERIDONE 1 MILLIGRAM(S): 4 TABLET ORAL at 17:33

## 2021-03-03 RX ADMIN — Medication 1 TABLET(S): at 06:13

## 2021-03-03 RX ADMIN — MIDODRINE HYDROCHLORIDE 10 MILLIGRAM(S): 2.5 TABLET ORAL at 13:50

## 2021-03-03 RX ADMIN — POTASSIUM PHOSPHATE, MONOBASIC POTASSIUM PHOSPHATE, DIBASIC 62.5 MILLIMOLE(S): 236; 224 INJECTION, SOLUTION INTRAVENOUS at 06:13

## 2021-03-03 RX ADMIN — CEFEPIME 100 MILLIGRAM(S): 1 INJECTION, POWDER, FOR SOLUTION INTRAMUSCULAR; INTRAVENOUS at 06:13

## 2021-03-03 RX ADMIN — Medication 40 MILLIEQUIVALENT(S): at 06:12

## 2021-03-03 RX ADMIN — Medication 250 MILLIGRAM(S): at 13:50

## 2021-03-03 RX ADMIN — MIDODRINE HYDROCHLORIDE 10 MILLIGRAM(S): 2.5 TABLET ORAL at 06:13

## 2021-03-03 RX ADMIN — Medication 250 MILLIGRAM(S): at 06:14

## 2021-03-03 RX ADMIN — CEFEPIME 100 MILLIGRAM(S): 1 INJECTION, POWDER, FOR SOLUTION INTRAMUSCULAR; INTRAVENOUS at 21:08

## 2021-03-03 RX ADMIN — Medication 250 MILLIGRAM(S): at 21:08

## 2021-03-03 RX ADMIN — Medication 3 MILLILITER(S): at 10:13

## 2021-03-03 NOTE — DIETITIAN INITIAL EVALUATION ADULT. - ORAL INTAKE PTA/DIET HISTORY
Pt was transferred from [facility].  As per transfer records, pt was on 2-4 gram sodium, NCS, puree consistency, Glucerna Shake 2 x day.  Food allergy to bananas, tomatoes, peanuts noted.  Pt denied being on Low sodium diet, puree foods and being allergic to bananas and tomatoes.   Pt confirmed allergy to peanuts & walnuts.

## 2021-03-03 NOTE — PHYSICAL THERAPY INITIAL EVALUATION ADULT - STRENGTHENING, PT EVAL
Improve strength in the B LE's to 4+/5 and be able to perform functional tasks-bed mobility, sitting, standing, transfers and ambulate in a safe manner with or without  assistive device and prevent falls.

## 2021-03-03 NOTE — DIETITIAN INITIAL EVALUATION ADULT. - FACTORS AFF FOOD INTAKE
as per pt., his upper dentures were misplaced & has bottom dentures in, hx of dysphagia noted, pt denied chewing/swallowing problems/difficulty chewing/difficulty swallowing/other (specify)

## 2021-03-03 NOTE — PHYSICAL THERAPY INITIAL EVALUATION ADULT - PATIENT/FAMILY AGREES WITH PLAN
Pending standing and ambulation assessment. pt plans to go back to subacute rehab where he has been living./yes

## 2021-03-03 NOTE — DIETITIAN INITIAL EVALUATION ADULT. - OTHER CALCULATIONS
IBW used for calculation of estimated needs. %IBW: 80%           % UBW: 85%, wt. gain of 2.4 kg since adm noted

## 2021-03-03 NOTE — PHYSICAL THERAPY INITIAL EVALUATION ADULT - PERTINENT HX OF CURRENT PROBLEM, REHAB EVAL
This is the hx of SUNDEEP. a 64 y/o male patient who was admitted to [facility] due to complications of Sepsis affecting medical condition and with subsequent affection on functional mobility.

## 2021-03-03 NOTE — CHART NOTE - NSCHARTNOTEFT_GEN_A_CORE
64 y/o M with a PMHx of HFPEF, CAD, GERD, COPD (wears BIPAP in NH), hx smoking now with nicotine dependence , crohn's/UC s/p colectomy on chronic steroids, prior alcohol use, ?psychiatry d/o, HLD and HTN was BIBEMS to the ED from Nursing home s/p syncopal episode.  Per EMS, Nursing home staff stated that patient synopsized and was experiencing agonal breathing.   SPO2 was noted to be 70 and BP was 50/40 at that time. Nursing home administered albuterol.   Per EMS stated that Patient's O2 sat ws 92% on CPAP and they did not administer any medication.   After review of nursing home chart patient has completed a course of moxifloxicin X  5 days, was prescribed meropenum and vancomycin on the same day as admission.    In the ED, noted to be hypotensive even after 2L IVF and was started on pressors.  Labs notable to leukocytosis of 26 and lactate of 2.6.  CTA negative for PE but with right upper lobe pneumonia. US abd with possible acute cholecystitis.   Pt was started on IV abx and pressors and admitted to the ICU for septic shock.    In the ICU, pt continued and was eventually weaned from pressors (last on 3/2 at 2000).  Sx was consulted for possible acute shefali (abdomen benign), but HIDA negative - no acute interventions. Diet started and pt tolerating.    Blood and urine cultures negative to date. MRSA PCR negative.  Will continue cefepime. Continue vancomycin until cultures final, favor 1 more day before d/c.  Per NH sheets, pt is on puree diet. S&S ordered for eval.  For TOV, russell removed on 3/3.  Of note, pt with soft/loose stool - hx of UC/crohn's but given multiple recent abx, will send c.diff sample.    Will conitnue with nightly BIPAP for support.  Tolerating 2-4L NC at this time.'  Stable for transfer to medical floors, JEFFERSON attending [facility] on 3/3/21.

## 2021-03-03 NOTE — DIETITIAN NUTRITION RISK NOTIFICATION - TREATMENT: THE FOLLOWING DIET HAS BEEN RECOMMENDED
Diet, Soft:   Fiber/Residue Restricted  Low Sodium (03-03-21 @ 12:22) [Pending Verification By Attending]  Diet, Soft:   Fiber/Residue Restricted  Low Sodium (03-02-21 @ 13:21) [Active]  Pt declined nutritional supplements, will add HBV snacks, ie., yogurt, puddings

## 2021-03-03 NOTE — PHYSICAL THERAPY INITIAL EVALUATION ADULT - ADDITIONAL COMMENTS
as per patient, he was independent living on [facility] where he was staying for approx. 2.5 years. he was independent without using AD, sharing a room with roomate.

## 2021-03-03 NOTE — PHYSICAL THERAPY INITIAL EVALUATION ADULT - DISCHARGE DISPOSITION, PT EVAL
Pending standing and ambulation assessment. Although pt wants to go back to [facility]/rehabilitation facility

## 2021-03-03 NOTE — PHYSICAL THERAPY INITIAL EVALUATION ADULT - TRANSFER SAFETY CONCERNS NOTED: SIT/STAND, REHAB EVAL
was not able to perform more than one attempt/decreased balance during turns/decreased sequencing ability

## 2021-03-03 NOTE — DIETITIAN INITIAL EVALUATION ADULT. - REASON
pt declined rest of exam as he stated that he is on bedpan, RD attempted to complete nutrition focus, pt remains on bedpan, as per RN, pt c hx of Crohn's

## 2021-03-03 NOTE — PHARMACOTHERAPY INTERVENTION NOTE - COMMENTS
Modified penicillin allergy to state that allergy was specific to piperacillin/tazobactam, not penicillin specifically. Modified both the penicillin allergy and the piperacillin/tazobactam allergy to state patient is tolerating cefepime on 3/2021.
Per protocol, ordered MRSA nasal PCR since patient is on vancomycin for suspected PNA.

## 2021-03-03 NOTE — PROGRESS NOTE ADULT - SUBJECTIVE AND OBJECTIVE BOX
SURGERY PROGRESS HPI:  Pt seen and examined at bedside. Patient denies any abdominal complaints. No acute events overnight. Pt denies nausea and vomiting.  +BM/flatus.      Vital Signs Last 24 Hrs  T(C): 35.8 (03 Mar 2021 08:00), Max: 36.8 (02 Mar 2021 13:51)  T(F): 96.4 (03 Mar 2021 08:00), Max: 98.3 (02 Mar 2021 13:51)  HR: 70 (03 Mar 2021 08:00) (69 - 126)  BP: 123/77 (03 Mar 2021 08:00) (88/49 - 123/77)  BP(mean): 88 (03 Mar 2021 08:00) (58 - 88)  RR: 21 (03 Mar 2021 08:00) (11 - 30)  SpO2: 95% (03 Mar 2021 08:00) (85% - 97%)      PHYSICAL EXAM:    GENERAL: NAD  HEAD:  Atraumatic, Normocephalic  CHEST/LUNG: Course breathsounds  HEART: RRR S1S2  ABDOMEN: non distended, +BS, soft, non tender, no guarding, - Roche sign  EXTREMITIES:  calf soft, non tender b/l    I&O's Detail    02 Mar 2021 07:01  -  03 Mar 2021 07:00  --------------------------------------------------------  IN:    dextrose 5% + sodium chloride 0.45% w/ Additives: 360 mL    IV PiggyBack: 150 mL    IV PiggyBack: 500 mL    IV PiggyBack: 1100 mL    IV PiggyBack: 300 mL    Norepinephrine: 66.5 mL    Oral Fluid: 600 mL  Total IN: 3076.5 mL    OUT:    Indwelling Catheter - Urethral (mL): 1900 mL  Total OUT: 1900 mL    Total NET: 1176.5 mL          LABS:                        10.9   12.82 )-----------( 135      ( 03 Mar 2021 03:36 )             35.5     03-03    140  |  99  |  13  ----------------------------<  179<H>  3.2<L>   |  36<H>  |  0.59    Ca    6.9<L>      03 Mar 2021 03:36  Phos  2.0     03-03  Mg     1.9     03-03    TPro  4.2<L>  /  Alb  1.9<L>  /  TBili  0.5  /  DBili  x   /  AST  4<L>  /  ALT  22  /  AlkPhos  85  03-03    PT/INR - ( 01 Mar 2021 17:37 )   PT: 15.1 sec;   INR: 1.32 ratio         PTT - ( 01 Mar 2021 17:37 )  PTT:20.7 sec  Urinalysis Basic - ( 02 Mar 2021 01:44 )    Color: Yellow / Appearance: Clear / S.005 / pH: x  Gluc: x / Ketone: Negative  / Bili: Negative / Urobili: Negative mg/dL   Blood: x / Protein: Negative mg/dL / Nitrite: Positive   Leuk Esterase: Negative / RBC: x / WBC 3-5   Sq Epi: x / Non Sq Epi: x / Bacteria: Occasional      Culture Results:   No growth to date. ( @ 23:06)  Culture Results:   No growth to date. ( @ 23:06)    type    RADIOLOGY & ADDITIONAL STUDIES:  < from: NM Hepatobiliary Imaging (21 @ 12:43) >    EXAM:  NM HEPATOBILIARY IMG                            PROCEDURE DATE:  2021          INTERPRETATION:  RADIOPHARMACEUTICAL:  99mTc-Mebrofenin  DOSE: 3.0 mCi IV    CLINICAL INFORMATION: 65 year old male with sepsis and cholelithiasis, referredto evaluate for acute cholecystitis    TECHNIQUE: Dynamic images of the anterior abdomen were obtained for 1 hour immediately following radiotracer injection. Static images of the abdomen in the anterior and right anterior oblique were also obtained.    COMPARISON: No previous hepatobiliary scan for comparison    FINDINGS: There is prompt uptake of the injected radiotracer by the hepatocytes. The gallbladder is first visualized at 20 minutes post tracer injection and bowel activity by 60 minutes.There is normal tracer clearance from the liver by the end of the study.    IMPRESSION: Normal hepatobiliary scan.    No scan evidence of acute cholecystitis.

## 2021-03-03 NOTE — PHYSICAL THERAPY INITIAL EVALUATION ADULT - TRANSFER TRAINING, PT EVAL
Independent in  transfer ability bed to chair and vice versa using appropriate assistive device  and prevent falls by 4-6 weeks

## 2021-03-03 NOTE — DIETITIAN INITIAL EVALUATION ADULT. - OTHER INFO
Pt c confusion at times, stated date of birth appropriately.    As per pt., he lost some wt. due illness in the past 1 month, usual wt. is 167 LBS, was ~ 145 LBS PTA.  Pt tolerated soft breakfast today, w/o swallowing problems as per RN.  Pt is requesting additional salt for hot cereal for breakfast, pt denied hx of heart failure. No hx of DM noted.   HX of Crohn's disease noted.  Pt declined offer for nutritional supplements, will add HBV snacks ie., yogurt, puddings.   RN to confirm diet, hx and allergies c Nursing Home.
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
## 2021-03-03 NOTE — PHYSICAL THERAPY INITIAL EVALUATION ADULT - CRITERIA FOR SKILLED THERAPEUTIC INTERVENTIONS
Pending standing and ambulation assessment. Although pt wants to go back to [facility]./impairments found/functional limitations in following categories/risk reduction/prevention/rehab potential/therapy frequency/predicted duration of therapy intervention/anticipated discharge recommendation
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
## 2021-03-03 NOTE — DIETITIAN INITIAL EVALUATION ADULT. - ETIOLOGY
inadequate protein-energy intake in setting of hx of COPD, cardiac dysfunction, Crohn's, hx of dysphagia

## 2021-03-03 NOTE — PHYSICAL THERAPY INITIAL EVALUATION ADULT - GENERAL OBSERVATIONS, REHAB EVAL
Chart reviewed, + contact precautions maintained. Pt encountered on supine, AxOx4, + O2 via NC 4mL, cooperative.

## 2021-03-04 LAB
ANION GAP SERPL CALC-SCNC: 4 MMOL/L — LOW (ref 5–17)
BUN SERPL-MCNC: 12 MG/DL — SIGNIFICANT CHANGE UP (ref 7–23)
CALCIUM SERPL-MCNC: 7.3 MG/DL — LOW (ref 8.5–10.1)
CHLORIDE SERPL-SCNC: 102 MMOL/L — SIGNIFICANT CHANGE UP (ref 96–108)
CO2 SERPL-SCNC: 36 MMOL/L — HIGH (ref 22–31)
CREAT SERPL-MCNC: 0.47 MG/DL — LOW (ref 0.5–1.3)
GLUCOSE SERPL-MCNC: 138 MG/DL — HIGH (ref 70–99)
HCT VFR BLD CALC: 38.3 % — LOW (ref 39–50)
HGB BLD-MCNC: 11.6 G/DL — LOW (ref 13–17)
MAGNESIUM SERPL-MCNC: 1.6 MG/DL — SIGNIFICANT CHANGE UP (ref 1.6–2.6)
MCHC RBC-ENTMCNC: 27.8 PG — SIGNIFICANT CHANGE UP (ref 27–34)
MCHC RBC-ENTMCNC: 30.3 GM/DL — LOW (ref 32–36)
MCV RBC AUTO: 91.6 FL — SIGNIFICANT CHANGE UP (ref 80–100)
NRBC # BLD: 0 /100 WBCS — SIGNIFICANT CHANGE UP (ref 0–0)
PHOSPHATE SERPL-MCNC: 2.1 MG/DL — LOW (ref 2.5–4.5)
PLATELET # BLD AUTO: 156 K/UL — SIGNIFICANT CHANGE UP (ref 150–400)
POTASSIUM SERPL-MCNC: 3.8 MMOL/L — SIGNIFICANT CHANGE UP (ref 3.5–5.3)
POTASSIUM SERPL-SCNC: 3.8 MMOL/L — SIGNIFICANT CHANGE UP (ref 3.5–5.3)
RBC # BLD: 4.18 M/UL — LOW (ref 4.2–5.8)
RBC # FLD: 21.1 % — HIGH (ref 10.3–14.5)
SARS-COV-2 IGG SERPL QL IA: NEGATIVE — SIGNIFICANT CHANGE UP
SARS-COV-2 IGM SERPL IA-ACNC: <0.1 INDEX — SIGNIFICANT CHANGE UP
SODIUM SERPL-SCNC: 142 MMOL/L — SIGNIFICANT CHANGE UP (ref 135–145)
WBC # BLD: 9.06 K/UL — SIGNIFICANT CHANGE UP (ref 3.8–10.5)
WBC # FLD AUTO: 9.06 K/UL — SIGNIFICANT CHANGE UP (ref 3.8–10.5)

## 2021-03-04 PROCEDURE — 99223 1ST HOSP IP/OBS HIGH 75: CPT

## 2021-03-04 PROCEDURE — 99233 SBSQ HOSP IP/OBS HIGH 50: CPT

## 2021-03-04 RX ORDER — SODIUM,POTASSIUM PHOSPHATES 278-250MG
1 POWDER IN PACKET (EA) ORAL
Refills: 0 | Status: COMPLETED | OUTPATIENT
Start: 2021-03-04 | End: 2021-03-06

## 2021-03-04 RX ADMIN — Medication 1 PACKET(S): at 12:12

## 2021-03-04 RX ADMIN — BUDESONIDE AND FORMOTEROL FUMARATE DIHYDRATE 2 PUFF(S): 160; 4.5 AEROSOL RESPIRATORY (INHALATION) at 18:10

## 2021-03-04 RX ADMIN — Medication 1 TABLET(S): at 12:12

## 2021-03-04 RX ADMIN — Medication 250 MILLIGRAM(S): at 05:05

## 2021-03-04 RX ADMIN — Medication 1 PACKET(S): at 18:10

## 2021-03-04 RX ADMIN — CEFEPIME 100 MILLIGRAM(S): 1 INJECTION, POWDER, FOR SOLUTION INTRAMUSCULAR; INTRAVENOUS at 15:10

## 2021-03-04 RX ADMIN — Medication 10 MILLIGRAM(S): at 05:05

## 2021-03-04 RX ADMIN — Medication 3 MILLILITER(S): at 23:08

## 2021-03-04 RX ADMIN — Medication 3 MILLILITER(S): at 05:16

## 2021-03-04 RX ADMIN — MIDODRINE HYDROCHLORIDE 10 MILLIGRAM(S): 2.5 TABLET ORAL at 05:06

## 2021-03-04 RX ADMIN — ENOXAPARIN SODIUM 40 MILLIGRAM(S): 100 INJECTION SUBCUTANEOUS at 12:12

## 2021-03-04 RX ADMIN — RISPERIDONE 1 MILLIGRAM(S): 4 TABLET ORAL at 05:06

## 2021-03-04 RX ADMIN — CEFEPIME 100 MILLIGRAM(S): 1 INJECTION, POWDER, FOR SOLUTION INTRAMUSCULAR; INTRAVENOUS at 05:05

## 2021-03-04 RX ADMIN — Medication 10 MILLIGRAM(S): at 18:10

## 2021-03-04 RX ADMIN — BUDESONIDE AND FORMOTEROL FUMARATE DIHYDRATE 2 PUFF(S): 160; 4.5 AEROSOL RESPIRATORY (INHALATION) at 05:06

## 2021-03-04 RX ADMIN — MIDODRINE HYDROCHLORIDE 10 MILLIGRAM(S): 2.5 TABLET ORAL at 15:10

## 2021-03-04 RX ADMIN — Medication 3 MILLILITER(S): at 11:05

## 2021-03-04 RX ADMIN — MIDODRINE HYDROCHLORIDE 10 MILLIGRAM(S): 2.5 TABLET ORAL at 21:15

## 2021-03-04 RX ADMIN — Medication 3 MILLILITER(S): at 17:05

## 2021-03-04 RX ADMIN — PANTOPRAZOLE SODIUM 40 MILLIGRAM(S): 20 TABLET, DELAYED RELEASE ORAL at 12:13

## 2021-03-04 RX ADMIN — Medication 3 MILLILITER(S): at 00:08

## 2021-03-04 RX ADMIN — ATORVASTATIN CALCIUM 80 MILLIGRAM(S): 80 TABLET, FILM COATED ORAL at 21:15

## 2021-03-04 RX ADMIN — Medication 2 TABLET(S): at 05:05

## 2021-03-04 RX ADMIN — CEFEPIME 100 MILLIGRAM(S): 1 INJECTION, POWDER, FOR SOLUTION INTRAMUSCULAR; INTRAVENOUS at 21:15

## 2021-03-04 NOTE — SWALLOW BEDSIDE ASSESSMENT ADULT - H & P REVIEW
66 y/o M with a PMHx of HFPEF, CAD, gerd COPD, Nicotine dependence , chrohn's s/p colectomy, prior alchohol use, ? psychiatry d/o,  HLD and HTN was BIBEMS to the ED from Nursing home s/p syncope episode today. Per EMS, Nursing home staff stated that Patient synopsized and was experiencing agonal breathing. SPO2 was noted to be 70 and BP was 50/40 at that time. Nursing home administered albuterol. On EMS stated that Patient's O2 sat ws 92% on CPAP and they did not administer any medication. In the ED, Patient is unsure what happened and does not remember passing out. Denies chest pain, abdominal pain, nausea, emesis, diarrhea, fever, COVID exposure or recent COVID vaccine./yes

## 2021-03-04 NOTE — CONSULT NOTE ADULT - ASSESSMENT
A/P:  64 y/o M PMH CHF, COPD, HTN, HLD, Crohns, admitted s/p syncope, with resp distress, now saturating well on 6L NC. RUL PNA. Leukocytosis: 26.   Abd US shows possible acute cholecystitis.   HIDA ordered by ICU, follow up. Keep NPO. Trend LFTs, leukocytosis. Trend lactate.   Continue broad spectrum abx. F/u pan cultures.    O2 and pressor support per primary ICU team.   Will discuss with surgical attending.     
Drug Rash  On rx vs. HCAP and COPD.  MRSA PCR nares negative, would stop vancomycin  Rash may continue to evolve even if offending agent stopped, making management difficult  No concern of SJS at this time    Suggestions--  Continue cefepime for now- no ideal options given polypharmacy  Stop vancomycin  Decrease steroids as able  Monitor rash    Reviewed with Dr. Martines.  Thank you for the courtesy of this referral.  Jhonatan Schwartz MD  Attending Physician  [facility]  Division of Infectious Diseases  [phone]

## 2021-03-04 NOTE — SWALLOW BEDSIDE ASSESSMENT ADULT - COMMENTS
CT head 3/1/2021 IMPRESSION:No acute intracranial hemorrhage or mass effect.    CT angio chest w/ IV contrast 3/1/2021 IMPRESSION:No evidence of pulmonary embolism. No evidence of dissection.  Right upper lobe pneumonia. Severe emphysema.

## 2021-03-04 NOTE — SWALLOW BEDSIDE ASSESSMENT ADULT - SLP PATIENT PROFILE REVIEW
Patient also requested an increase in her zoloft.  She states it's not really making a difference for her.     yes

## 2021-03-04 NOTE — SWALLOW BEDSIDE ASSESSMENT ADULT - SLP GENERAL OBSERVATIONS
pt seen bedside alert and oriented x3-4. pt engaged in simple conversation for assessment, he verbalized wants and was able to follow one step directions [facility]. noted good speech intelligibility pt seen bedside, on NC 02 alert and oriented x3-4. pt engaged in simple conversation for assessment, he verbalized wants and was able to follow one step [facility]. noted good speech intelligibility

## 2021-03-04 NOTE — SWALLOW BEDSIDE ASSESSMENT ADULT - SWALLOW EVAL: RECOMMENDED FEEDING/EATING TECHNIQUES
check mouth frequently for oral residue/pocketing/maintain upright posture during/after eating for 30 mins/oral hygiene/small sips/bites

## 2021-03-04 NOTE — SWALLOW BEDSIDE ASSESSMENT ADULT - SWALLOW EVAL: DIAGNOSIS
pt presented with oropharyngeal phases of swallow grossly WNL, except decreased bolus formation for soft solid causing trace lingual residue- pt independently cleared. no overt signs of aspiration

## 2021-03-04 NOTE — CONSULT NOTE ADULT - SUBJECTIVE AND OBJECTIVE BOX
Full note to follow  Rash, noted today  Face sheet says NKDA but buried in paper chart from facility (page 3) patient listed as allergic to penicilliin and piperacillin.  Patient received Zosyn this admit and had a reaction to it as per CCM note, though in most recent note there is no mention of rash  Patient seems lucid presently but is not sure to what he is allergic   Patient also received moxifloxacin, vancomycin, and meropenem prior to admission  Here he has also received ceftriaxone, azithromycin, flagyl, and is on cefepime/vancomycin presently.  On rx vs. HCAP and COPD.  MRSA PCR nares negative, would stop vancomycin  Rash may continue to evolve even if offending agent stopped, making management difficult  Continue cefepime for now- no ideal options given polypharmacy  Stop vancomycin  Decrease steroids as able  Monitor rash, watch for SJS (no concern thus far)  Reviewed with Dr. Martines.  Thank you for the courtesy of this referral.  Jhonatan Schwartz MD  Attending Physician  [facility]  Division of Infectious Diseases  [phone]  ------------------  [facility]  Division of Infectious Diseases  [phone]    KERVIN CHOI  65y, Male  [id number]    HPI--      PMH/PSH--  HLD (hyperlipidemia)    CHF (congestive heart failure)    COPD (chronic obstructive pulmonary disease)    HTN (hypertension)    No significant past surgical history        Allergies--  Bananas (Unknown)  peanuts (Anaphylaxis)  penicillin (Short breath; Anaphylaxis)  piperacillin-tazobactam (Rash; Urticaria; Flushing; Hives)  Tomatoes (Unknown)      Medications--  Antibiotics: cefepime   IVPB      cefepime   IVPB 2000 milliGRAM(s) IV Intermittent every 8 hours    Immunologic:   Other: albuterol/ipratropium for Nebulization  atorvastatin  budesonide 160 MICROgram(s)/formoterol 4.5 MICROgram(s) Inhaler  enoxaparin Injectable  hydrocortisone  lactobacillus acidophilus  midodrine  ondansetron Injectable PRN  pantoprazole  Injectable  potassium phosphate / sodium phosphate Powder (PHOS-NaK)  risperiDONE   Tablet    Antimicrobials last 90 days per EMR: MEDICATIONS  (STANDING):    azithromycin  IVPB   255 mL/Hr IV Intermittent (03-02-21 @ 02:36)    cefepime   IVPB   100 mL/Hr IV Intermittent (03-02-21 @ 06:01)    cefepime   IVPB   100 mL/Hr IV Intermittent (03-04-21 @ 05:05)   100 mL/Hr IV Intermittent (03-03-21 @ 21:08)   100 mL/Hr IV Intermittent (03-03-21 @ 13:50)   100 mL/Hr IV Intermittent (03-03-21 @ 06:13)   100 mL/Hr IV Intermittent (03-02-21 @ 21:57)   100 mL/Hr IV Intermittent (03-02-21 @ 13:54)    cefTRIAXone   IVPB   100 mL/Hr IV Intermittent (03-01-21 @ 18:57)    metroNIDAZOLE  IVPB   100 mL/Hr IV Intermittent (03-02-21 @ 06:38)    piperacillin/tazobactam IVPB.   200 mL/Hr IV Intermittent (03-02-21 @ 04:10)    vancomycin  IVPB   250 mL/Hr IV Intermittent (03-04-21 @ 05:05)   250 mL/Hr IV Intermittent (03-03-21 @ 21:08)   250 mL/Hr IV Intermittent (03-03-21 @ 13:50)   250 mL/Hr IV Intermittent (03-03-21 @ 06:14)   250 mL/Hr IV Intermittent (03-02-21 @ 21:57)   250 mL/Hr IV Intermittent (03-02-21 @ 16:11)   250 mL/Hr IV Intermittent (03-02-21 @ 08:43)        Social History--  EtOH: denies   Tobacco: denies   Drug Use: denies     Family/Marital History--        Travel/Environmental/Occupational History:      Review of Systems:  A >=10-point review of systems was obtained.     Pertinent positives and negatives--  Constitutional: No fevers. No Chills. No Rigors.   Eyes:  ENMT:  Cardiovascular: No chest pain. No palpitations.  Respiratory: No shortness of breath. No cough.  Gastrointestinal: No nausea or vomiting. No diarrhea or constipation.   Genitourinary:  Musculoskeletal:  Skin:  Neurologic:  Psychiatric: Pleasant. Appropriate affect.  Endocrine:  Heme/Lymphatic:  Allergy/Immunologic:    Review of systems otherwise negative except as previously noted.    Physical Exam--  Vital Signs: T(F): 97.6 (03-04-21 @ 11:32), Max: 98 (03-03-21 @ 19:02)  HR: 100 (03-04-21 @ 11:32)  BP: 100/62 (03-04-21 @ 11:32)  RR: 17 (03-04-21 @ 11:32)  SpO2: 94% (03-04-21 @ 11:32)  Wt(kg): --  General: Nontoxic-appearing Male in no acute distress.  HEENT: AT/NC. PERRL. EOMI. Anicteric. Conjunctiva pink and moist. Oropharynx clear. Dentition fair.  Neck: Not rigid. No sense of mass.  Nodes: None palpable.  Lungs: Clear bilaterally without rales, wheezing or rhonchi  Heart: Regular rate and rhythm. No Murmur. No rub. No gallop. No palpable thrill.  Abdomen: Bowel sounds present and normoactive. Soft. Nondistended. Nontender. No sense of mass. No organomegaly.  Back: No spinal tenderness. No costovertebral angle tenderness.   Extremities: No cyanosis or clubbing. No edema.   Skin: Warm. Dry. Good turgor. No rash. No vasculitic stigmata.  Psychiatric: Appropriate affect and mood for situation.         Laboratory & Imaging Data--  CBC                        11.6   9.06  )-----------( 156      ( 04 Mar 2021 08:25 )             38.3       Chemistries  03-04    142  |  102  |  12  ----------------------------<  138<H>  3.8   |  36<H>  |  0.47<L>    Ca    7.3<L>      04 Mar 2021 08:25  Phos  2.1     03-04  Mg     1.6     03-04    TPro  4.2<L>  /  Alb  1.9<L>  /  TBili  0.5  /  DBili  x   /  AST  4<L>  /  ALT  22  /  AlkPhos  85  03-03      Culture Data    Culture - Urine (collected 02 Mar 2021 08:25)  Source: .Urine Clean Catch (Midstream)  Final Report (03 Mar 2021 10:12):    <10,000 CFU/mL Normal Urogenital Jesica    Culture - Blood (collected 02 Mar 2021 08:13)  Source: .Blood Blood-Venous  Preliminary Report (03 Mar 2021 09:57):    No growth to date.    Culture - Blood (collected 01 Mar 2021 23:06)  Source: .Blood Blood-Peripheral  Preliminary Report (03 Mar 2021 01:06):    No growth to date.    Culture - Blood (collected 01 Mar 2021 23:06)  Source: .Blood Blood-Peripheral  Preliminary Report (03 Mar 2021 01:06):    No growth to date.         Full note to follow  Rash, noted today  Face sheet says NKDA but buried in paper chart from facility (page 3) patient listed as allergic to penicilliin and piperacillin.  Patient received Zosyn this admit and had a reaction to it as per CCM note, though in most recent note there is no mention of rash  Patient seems lucid presently but is not sure to what he is allergic   Patient also received moxifloxacin, vancomycin, and meropenem prior to admission  Here he has also received ceftriaxone, azithromycin, flagyl, and is on cefepime/vancomycin presently.  On rx vs. HCAP and COPD.  MRSA PCR nares negative, would stop vancomycin  Rash may continue to evolve even if offending agent stopped, making management difficult  Continue cefepime for now- no ideal options given polypharmacy  Stop vancomycin  Decrease steroids as able  Monitor rash, watch for SJS (no concern thus far)  Reviewed with Dr. Martines.  Thank you for the courtesy of this referral.  Jhonatan Schwartz MD  Attending Physician  [facility]  Division of Infectious Diseases  [phone]  ------------------  [facility]  Division of Infectious Diseases  [phone]    KERVIN CHOI  65y, Male  [id number]    HPI--  65M with COPD, CAD, CHF, Crohn's disease (no biologics, only steroids), HTN, admitted from [facility] with shock and hypoxemia, treated for nosocomial pneumonia with resolution of shock and improvement in oxygenation. Patient transferred from ICU to floor. ID called for rash. Face sheet from SNF says NKDA but buried in paper chart from facility (page 3) patient listed as allergic to penicilliin and piperacillin. Patient received Zosyn this admit and had a reaction to it as per CCM note, though in most recent note there is no mention of rash. Patient seems lucid presently but is not sure to what he is allergic. Patient also received moxifloxacin, vancomycin, and meropenem prior to admission. Here he has also received ceftriaxone, azithromycin, flagyl, and is on cefepime/vancomycin presently.    Patient without complaints at present comfortable on NC. No eye pain, dysphagia/odynophagia, or dysuria. No blistering/peeling skin.     PMH/PSH--  HLD (hyperlipidemia)    CHF (congestive heart failure)    COPD (chronic obstructive pulmonary disease)    HTN (hypertension)    No significant past surgical history        Allergies--  Bananas (Unknown)  peanuts (Anaphylaxis)  penicillin (Short breath; Anaphylaxis)  piperacillin-tazobactam (Rash; Urticaria; Flushing; Hives)  Tomatoes (Unknown)      Medications--  Antibiotics: cefepime   IVPB      cefepime   IVPB 2000 milliGRAM(s) IV Intermittent every 8 hours    Immunologic:   Other: albuterol/ipratropium for Nebulization  atorvastatin  budesonide 160 MICROgram(s)/formoterol 4.5 MICROgram(s) Inhaler  enoxaparin Injectable  hydrocortisone  lactobacillus acidophilus  midodrine  ondansetron Injectable PRN  pantoprazole  Injectable  potassium phosphate / sodium phosphate Powder (PHOS-NaK)  risperiDONE   Tablet    Antimicrobials last 90 days per EMR: MEDICATIONS  (STANDING):    azithromycin  IVPB   255 mL/Hr IV Intermittent (03-02-21 @ 02:36)    cefepime   IVPB   100 mL/Hr IV Intermittent (03-02-21 @ 06:01)    cefepime   IVPB   100 mL/Hr IV Intermittent (03-04-21 @ 05:05)   100 mL/Hr IV Intermittent (03-03-21 @ 21:08)   100 mL/Hr IV Intermittent (03-03-21 @ 13:50)   100 mL/Hr IV Intermittent (03-03-21 @ 06:13)   100 mL/Hr IV Intermittent (03-02-21 @ 21:57)   100 mL/Hr IV Intermittent (03-02-21 @ 13:54)    cefTRIAXone   IVPB   100 mL/Hr IV Intermittent (03-01-21 @ 18:57)    metroNIDAZOLE  IVPB   100 mL/Hr IV Intermittent (03-02-21 @ 06:38)    piperacillin/tazobactam IVPB.   200 mL/Hr IV Intermittent (03-02-21 @ 04:10)    vancomycin  IVPB   250 mL/Hr IV Intermittent (03-04-21 @ 05:05)   250 mL/Hr IV Intermittent (03-03-21 @ 21:08)   250 mL/Hr IV Intermittent (03-03-21 @ 13:50)   250 mL/Hr IV Intermittent (03-03-21 @ 06:14)   250 mL/Hr IV Intermittent (03-02-21 @ 21:57)   250 mL/Hr IV Intermittent (03-02-21 @ 16:11)   250 mL/Hr IV Intermittent (03-02-21 @ 08:43)        Social History--  EtOH: denies   Tobacco: prior  Drug Use: denies     Family/Marital History--  No relevant         Review of Systems:  A >=10-point review of systems was obtained.     Pertinent positives and negatives--  Constitutional: No fevers. No Chills. No Rigors.   Eyes: denies.   ENMT: denies.   Cardiovascular: No chest pain. No palpitations.  Respiratory: + shortness of breath. Scant cough.  Gastrointestinal: No nausea or vomiting. No diarrhea or constipation.   Genitourinary: denies.   Musculoskeletal: denies.   Skin: as above  Neurologic: chronic paresthesia bottom of feet  Psychiatric: Pleasant. Appropriate affect.  Endocrine: denies.   Heme/Lymphatic: denies.   Allergy/Immunologic:    Review of systems otherwise negative except as previously noted.    Physical Exam--  Vital Signs: T(F): 97.6 (03-04-21 @ 11:32), Max: 98 (03-03-21 @ 19:02)  HR: 100 (03-04-21 @ 11:32)  BP: 100/62 (03-04-21 @ 11:32)  RR: 17 (03-04-21 @ 11:32)  SpO2: 94% (03-04-21 @ 11:32)  Wt(kg): --  General: Nontoxic-appearing Male in no acute distress.  HEENT: AT/NC. PERRL. EOMI. Anicteric. Conjunctiva pink and moist. Oropharynx clear  Neck: Not rigid. No sense of mass.  Nodes: None palpable.  Lungs: Diminished BS B w crackles  Heart: Regular rate and rhythm. No Murmur. No rub. No gallop. No palpable thrill.  Abdomen: Bowel sounds present and normoactive. Soft. Nondistended. Nontender. No sense of mass. No organomegaly.  Back: No spinal tenderness. No costovertebral angle tenderness.   Extremities: No cyanosis or clubbing. No edema.   Skin: Warm. Dry. Good turgor. Diffuse rash, macular, most prominent on trunk, c/w drug exanthem. Palms/soles spared. No bullae. No anesthesia. No tenderness. No desquamation. No vasculitic stigmata.  Psychiatric: Appropriate affect and mood for situation.         Laboratory & Imaging Data--  CBC                        11.6   9.06  )-----------( 156      ( 04 Mar 2021 08:25 )             38.3       Chemistries  03-04    142  |  102  |  12  ----------------------------<  138<H>  3.8   |  36<H>  |  0.47<L>    Ca    7.3<L>      04 Mar 2021 08:25  Phos  2.1     03-04  Mg     1.6     03-04    TPro  4.2<L>  /  Alb  1.9<L>  /  TBili  0.5  /  DBili  x   /  AST  4<L>  /  ALT  22  /  AlkPhos  85  03-03      Culture Data    Culture - Urine (collected 02 Mar 2021 08:25)  Source: .Urine Clean Catch (Midstream)  Final Report (03 Mar 2021 10:12):    <10,000 CFU/mL Normal Urogenital Jesica    Culture - Blood (collected 02 Mar 2021 08:13)  Source: .Blood Blood-Venous  Preliminary Report (03 Mar 2021 09:57):    No growth to date.    Culture - Blood (collected 01 Mar 2021 23:06)  Source: .Blood Blood-Peripheral  Preliminary Report (03 Mar 2021 01:06):    No growth to date.    Culture - Blood (collected 01 Mar 2021 23:06)  Source: .Blood Blood-Peripheral  Preliminary Report (03 Mar 2021 01:06):    No growth to date.

## 2021-03-04 NOTE — PROGRESS NOTE ADULT - SUBJECTIVE AND OBJECTIVE BOX
Patient is a 65y old  Male who presents with a chief complaint of syncope, sepsis (03 Mar 2021 19:07)    INTERVAL HPI/OVERNIGHT EVENTS:  Pt was seen and examined, no acute events.    MEDICATIONS  (STANDING):  albuterol/ipratropium for Nebulization 3 milliLiter(s) Nebulizer every 6 hours  atorvastatin 80 milliGRAM(s) Oral at bedtime  budesonide 160 MICROgram(s)/formoterol 4.5 MICROgram(s) Inhaler 2 Puff(s) Inhalation two times a day  cefepime   IVPB      cefepime   IVPB 2000 milliGRAM(s) IV Intermittent every 8 hours  enoxaparin Injectable 40 milliGRAM(s) SubCutaneous daily  hydrocortisone 10 milliGRAM(s) Oral two times a day  lactobacillus acidophilus 1 Tablet(s) Oral daily  midodrine 10 milliGRAM(s) Oral every 8 hours  pantoprazole  Injectable 40 milliGRAM(s) IV Push daily  potassium phosphate / sodium phosphate Powder (PHOS-NaK) 1 Packet(s) Oral two times a day  risperiDONE   Tablet 1 milliGRAM(s) Oral two times a day    MEDICATIONS  (PRN):  ondansetron Injectable 4 milliGRAM(s) IV Push every 6 hours PRN Nausea and/or Vomiting      Allergies  Bananas (Unknown)  peanuts (Anaphylaxis)  penicillin (Short breath; Anaphylaxis)  piperacillin-tazobactam (Rash; Urticaria; Flushing; Hives)  Tomatoes (Unknown)      Vital Signs Last 24 Hrs  T(C): 36.4 (04 Mar 2021 11:32), Max: 36.7 (03 Mar 2021 19:02)  T(F): 97.6 (04 Mar 2021 11:32), Max: 98 (03 Mar 2021 19:02)  HR: 100 (04 Mar 2021 11:32) (63 - 100)  BP: 100/62 (04 Mar 2021 11:32) (97/61 - 127/83)  BP(mean): 80 (03 Mar 2021 17:00) (80 - 94)  RR: 17 (04 Mar 2021 11:32) (17 - 29)  SpO2: 94% (04 Mar 2021 11:32) (90% - 98%)      PHYSICAL EXAM:  GENERAL: NAD  HEAD:  Atraumatic  EYES: PERRLA  NERVOUS SYSTEM:  Awake, alert  CHEST/LUNG: Diminished, on 4L NC  HEART: RRR  ABDOMEN: Soft, non tender  EXTREMITIES:  no edema  SKIN: macular erythematous rash on abdomen.    LABS:                        11.6   9.06  )-----------( 156      ( 04 Mar 2021 08:25 )             38.3     03-04    142  |  102  |  12  ----------------------------<  138<H>  3.8   |  36<H>  |  0.47<L>    Ca    7.3<L>      04 Mar 2021 08:25  Phos  2.1     03-04  Mg     1.6     03-04    TPro  4.2<L>  /  Alb  1.9<L>  /  TBili  0.5  /  DBili  x   /  AST  4<L>  /  ALT  22  /  AlkPhos  85  03-03      CAPILLARY BLOOD GLUCOSE      Culture - Urine (collected 02 Mar 2021 08:25)  Source: .Urine Clean Catch (Midstream)  Final Report (03 Mar 2021 10:12):    <10,000 CFU/mL Normal Urogenital Jesica    Culture - Blood (collected 02 Mar 2021 08:13)  Source: .Blood Blood-Venous  Preliminary Report (03 Mar 2021 09:57):    No growth to date.    Culture - Blood (collected 01 Mar 2021 23:06)  Source: .Blood Blood-Peripheral  Preliminary Report (03 Mar 2021 01:06):    No growth to date.    Culture - Blood (collected 01 Mar 2021 23:06)  Source: .Blood Blood-Peripheral  Preliminary Report (03 Mar 2021 01:06):    No growth to date.      RADIOLOGY & ADDITIONAL TESTS:    Imaging Personally Reviewed:  [ ] YES  [ ] NO    Consultant(s) Notes Reviewed:  [ ] YES  [ ] NO    Care Discussed with Consultants/Other Providers [ ] YES  [ ] NO

## 2021-03-05 LAB
ALBUMIN SERPL ELPH-MCNC: 2.2 G/DL — LOW (ref 3.3–5)
ALP SERPL-CCNC: 83 U/L — SIGNIFICANT CHANGE UP (ref 40–120)
ALT FLD-CCNC: 24 U/L — SIGNIFICANT CHANGE UP (ref 12–78)
ANION GAP SERPL CALC-SCNC: 6 MMOL/L — SIGNIFICANT CHANGE UP (ref 5–17)
AST SERPL-CCNC: 10 U/L — LOW (ref 15–37)
BILIRUB SERPL-MCNC: 1.1 MG/DL — SIGNIFICANT CHANGE UP (ref 0.2–1.2)
BUN SERPL-MCNC: 9 MG/DL — SIGNIFICANT CHANGE UP (ref 7–23)
CALCIUM SERPL-MCNC: 7.8 MG/DL — LOW (ref 8.5–10.1)
CHLORIDE SERPL-SCNC: 97 MMOL/L — SIGNIFICANT CHANGE UP (ref 96–108)
CO2 SERPL-SCNC: 38 MMOL/L — HIGH (ref 22–31)
CREAT SERPL-MCNC: 0.57 MG/DL — SIGNIFICANT CHANGE UP (ref 0.5–1.3)
GLUCOSE SERPL-MCNC: 88 MG/DL — SIGNIFICANT CHANGE UP (ref 70–99)
HCT VFR BLD CALC: 45.3 % — SIGNIFICANT CHANGE UP (ref 39–50)
HGB BLD-MCNC: 13.8 G/DL — SIGNIFICANT CHANGE UP (ref 13–17)
MCHC RBC-ENTMCNC: 27.6 PG — SIGNIFICANT CHANGE UP (ref 27–34)
MCHC RBC-ENTMCNC: 30.5 GM/DL — LOW (ref 32–36)
MCV RBC AUTO: 90.6 FL — SIGNIFICANT CHANGE UP (ref 80–100)
NRBC # BLD: 0 /100 WBCS — SIGNIFICANT CHANGE UP (ref 0–0)
PLATELET # BLD AUTO: 162 K/UL — SIGNIFICANT CHANGE UP (ref 150–400)
POTASSIUM SERPL-MCNC: 3.3 MMOL/L — LOW (ref 3.5–5.3)
POTASSIUM SERPL-SCNC: 3.3 MMOL/L — LOW (ref 3.5–5.3)
PROT SERPL-MCNC: 4.8 GM/DL — LOW (ref 6–8.3)
RBC # BLD: 5 M/UL — SIGNIFICANT CHANGE UP (ref 4.2–5.8)
RBC # FLD: 21.1 % — HIGH (ref 10.3–14.5)
SODIUM SERPL-SCNC: 141 MMOL/L — SIGNIFICANT CHANGE UP (ref 135–145)
WBC # BLD: 8.8 K/UL — SIGNIFICANT CHANGE UP (ref 3.8–10.5)
WBC # FLD AUTO: 8.8 K/UL — SIGNIFICANT CHANGE UP (ref 3.8–10.5)

## 2021-03-05 PROCEDURE — 99233 SBSQ HOSP IP/OBS HIGH 50: CPT

## 2021-03-05 RX ORDER — LOPERAMIDE HCL 2 MG
2 TABLET ORAL EVERY 4 HOURS
Refills: 0 | Status: DISCONTINUED | OUTPATIENT
Start: 2021-03-05 | End: 2021-03-08

## 2021-03-05 RX ORDER — POTASSIUM CHLORIDE 20 MEQ
40 PACKET (EA) ORAL ONCE
Refills: 0 | Status: COMPLETED | OUTPATIENT
Start: 2021-03-05 | End: 2021-03-05

## 2021-03-05 RX ADMIN — ENOXAPARIN SODIUM 40 MILLIGRAM(S): 100 INJECTION SUBCUTANEOUS at 13:08

## 2021-03-05 RX ADMIN — CEFEPIME 100 MILLIGRAM(S): 1 INJECTION, POWDER, FOR SOLUTION INTRAMUSCULAR; INTRAVENOUS at 05:05

## 2021-03-05 RX ADMIN — CEFEPIME 100 MILLIGRAM(S): 1 INJECTION, POWDER, FOR SOLUTION INTRAMUSCULAR; INTRAVENOUS at 14:02

## 2021-03-05 RX ADMIN — Medication 1 PACKET(S): at 05:06

## 2021-03-05 RX ADMIN — Medication 10 MILLIGRAM(S): at 17:55

## 2021-03-05 RX ADMIN — Medication 3 MILLILITER(S): at 05:26

## 2021-03-05 RX ADMIN — RISPERIDONE 1 MILLIGRAM(S): 4 TABLET ORAL at 05:06

## 2021-03-05 RX ADMIN — MIDODRINE HYDROCHLORIDE 10 MILLIGRAM(S): 2.5 TABLET ORAL at 05:06

## 2021-03-05 RX ADMIN — Medication 1 PACKET(S): at 17:55

## 2021-03-05 RX ADMIN — Medication 1 TABLET(S): at 13:08

## 2021-03-05 RX ADMIN — CEFEPIME 100 MILLIGRAM(S): 1 INJECTION, POWDER, FOR SOLUTION INTRAMUSCULAR; INTRAVENOUS at 21:19

## 2021-03-05 RX ADMIN — Medication 3 MILLILITER(S): at 17:22

## 2021-03-05 RX ADMIN — MIDODRINE HYDROCHLORIDE 10 MILLIGRAM(S): 2.5 TABLET ORAL at 14:02

## 2021-03-05 RX ADMIN — MIDODRINE HYDROCHLORIDE 10 MILLIGRAM(S): 2.5 TABLET ORAL at 21:19

## 2021-03-05 RX ADMIN — Medication 40 MILLIEQUIVALENT(S): at 15:18

## 2021-03-05 RX ADMIN — BUDESONIDE AND FORMOTEROL FUMARATE DIHYDRATE 2 PUFF(S): 160; 4.5 AEROSOL RESPIRATORY (INHALATION) at 17:55

## 2021-03-05 RX ADMIN — RISPERIDONE 1 MILLIGRAM(S): 4 TABLET ORAL at 17:54

## 2021-03-05 RX ADMIN — PANTOPRAZOLE SODIUM 40 MILLIGRAM(S): 20 TABLET, DELAYED RELEASE ORAL at 13:08

## 2021-03-05 RX ADMIN — Medication 3 MILLILITER(S): at 23:15

## 2021-03-05 RX ADMIN — Medication 3 MILLILITER(S): at 11:01

## 2021-03-05 RX ADMIN — ATORVASTATIN CALCIUM 80 MILLIGRAM(S): 80 TABLET, FILM COATED ORAL at 21:19

## 2021-03-05 RX ADMIN — Medication 10 MILLIGRAM(S): at 05:06

## 2021-03-05 RX ADMIN — BUDESONIDE AND FORMOTEROL FUMARATE DIHYDRATE 2 PUFF(S): 160; 4.5 AEROSOL RESPIRATORY (INHALATION) at 05:06

## 2021-03-05 RX ADMIN — Medication 2 MILLIGRAM(S): at 15:18

## 2021-03-05 NOTE — PROGRESS NOTE ADULT - SUBJECTIVE AND OBJECTIVE BOX
Patient is a 65y old  Male who presents with a chief complaint of syncope, sepsis (05 Mar 2021 10:31)      INTERVAL HPI/OVERNIGHT EVENTS:  Pt was seen and examined, no acute events.    MEDICATIONS  (STANDING):  albuterol/ipratropium for Nebulization 3 milliLiter(s) Nebulizer every 6 hours  atorvastatin 80 milliGRAM(s) Oral at bedtime  budesonide 160 MICROgram(s)/formoterol 4.5 MICROgram(s) Inhaler 2 Puff(s) Inhalation two times a day  cefepime   IVPB      cefepime   IVPB 2000 milliGRAM(s) IV Intermittent every 8 hours  enoxaparin Injectable 40 milliGRAM(s) SubCutaneous daily  hydrocortisone 10 milliGRAM(s) Oral two times a day  lactobacillus acidophilus 1 Tablet(s) Oral daily  midodrine 10 milliGRAM(s) Oral every 8 hours  pantoprazole  Injectable 40 milliGRAM(s) IV Push daily  potassium phosphate / sodium phosphate Powder (PHOS-NaK) 1 Packet(s) Oral two times a day  risperiDONE   Tablet 1 milliGRAM(s) Oral two times a day    MEDICATIONS  (PRN):  ondansetron Injectable 4 milliGRAM(s) IV Push every 6 hours PRN Nausea and/or Vomiting      Allergies  Bananas (Unknown)  peanuts (Anaphylaxis)  penicillin (Short breath; Anaphylaxis)  piperacillin-tazobactam (Rash; Urticaria; Flushing; Hives)  Tomatoes (Unknown)        Vital Signs Last 24 Hrs  T(C): 36.9 (05 Mar 2021 11:01), Max: 36.9 (05 Mar 2021 11:01)  T(F): 98.4 (05 Mar 2021 11:01), Max: 98.4 (05 Mar 2021 11:01)  HR: 105 (05 Mar 2021 12:06) (69 - 110)  BP: 106/68 (05 Mar 2021 11:01) (100/63 - 119/74)  BP(mean): --  RR: 18 (05 Mar 2021 11:01) (16 - 18)  SpO2: 94% (05 Mar 2021 12:06) (90% - 96%)      PHYSICAL EXAM:  GENERAL: NAD  HEAD:  Atraumatic  EYES: PERRLA  NERVOUS SYSTEM:  Awake, alert  CHEST/LUNG: Diminished , on 4L NC  HEART: RRR  ABDOMEN: Soft, non tender  EXTREMITIES: no edema         LABS:                        13.8   8.80  )-----------( 162      ( 05 Mar 2021 08:09 )             45.3     03-05    141  |  97  |  9   ----------------------------<  88  3.3<L>   |  38<H>  |  0.57    Ca    7.8<L>      05 Mar 2021 08:09  Phos  2.1     03-04  Mg     1.6     03-04    TPro  4.8<L>  /  Alb  2.2<L>  /  TBili  1.1  /  DBili  x   /  AST  10<L>  /  ALT  24  /  AlkPhos  83  03-05    CAPILLARY BLOOD GLUCOSE    Culture - Urine (collected 02 Mar 2021 08:25)  Source: .Urine Clean Catch (Midstream)  Final Report (03 Mar 2021 10:12):    <10,000 CFU/mL Normal Urogenital Jesica    Culture - Blood (collected 02 Mar 2021 08:13)  Source: .Blood Blood-Venous  Preliminary Report (03 Mar 2021 09:57):    No growth to date.    Culture - Blood (collected 01 Mar 2021 23:06)  Source: .Blood Blood-Peripheral  Preliminary Report (03 Mar 2021 01:06):    No growth to date.    Culture - Blood (collected 01 Mar 2021 23:06)  Source: .Blood Blood-Peripheral  Preliminary Report (03 Mar 2021 01:06):    No growth to date.      RADIOLOGY & ADDITIONAL TESTS:    Imaging Personally Reviewed:  [ ] YES  [ ] NO    Consultant(s) Notes Reviewed:  [ ] YES  [ ] NO    Care Discussed with Consultants/Other Providers [ ] YES  [ ] NO

## 2021-03-05 NOTE — PROGRESS NOTE ADULT - SUBJECTIVE AND OBJECTIVE BOX
[facility]  Division of Infectious Diseases  [phone]    Name: KERVIN CHOI  Age: 65y  Gender: Male  MRN: [id number]    Interval History--  Notes reviewed    Past Medical History--  HLD (hyperlipidemia)    CHF (congestive heart failure)    COPD (chronic obstructive pulmonary disease)    HTN (hypertension)    No significant past surgical history        For details regarding the patient's social history, family history, and other miscellaneous elements, please refer the initial infectious diseases consultation and/or the admitting history and physical examination for this admission.    Allergies    Bananas (Unknown)  peanuts (Anaphylaxis)  penicillin (Short breath; Anaphylaxis)  piperacillin-tazobactam (Rash; Urticaria; Flushing; Hives)  Tomatoes (Unknown)    Intolerances        Medications--  Antibiotics:  cefepime   IVPB      cefepime   IVPB 2000 milliGRAM(s) IV Intermittent every 8 hours    Immunologic:    Other:  albuterol/ipratropium for Nebulization  atorvastatin  budesonide 160 MICROgram(s)/formoterol 4.5 MICROgram(s) Inhaler  enoxaparin Injectable  hydrocortisone  lactobacillus acidophilus  midodrine  ondansetron Injectable PRN  pantoprazole  Injectable  potassium phosphate / sodium phosphate Powder (PHOS-NaK)  risperiDONE   Tablet      Review of Systems--  A 10-point review of systems was obtained.     Pertinent positives and negatives--  Constitutional: No fevers. No Chills. No Rigors.   Cardiovascular: No chest pain. No palpitations.  Respiratory: No shortness of breath. No cough.  Gastrointestinal: No nausea or vomiting. No diarrhea or constipation.   Psychiatric: Pleasant. Appropriate affect.    Review of systems otherwise negative except as previously noted.    Physical Examination--  Vital Signs: T(F): 97.5 (03-05-21 @ 06:21), Max: 97.6 (03-04-21 @ 11:32)  HR: 110 (03-05-21 @ 09:45)  BP: 119/74 (03-05-21 @ 06:21)  RR: 18 (03-05-21 @ 06:21)  SpO2: 90% (03-05-21 @ 09:45)  Wt(kg): --  General: Nontoxic-appearing Male in no acute distress.  HEENT: AT/NC. Anicteric. Conjunctiva pink and moist. Oropharynx clear  Neck: Not rigid. No sense of mass.  Nodes: None palpable.  Lungs: Diminished BS B   Heart: Regular rate and rhythm. No Murmur. No rub. No gallop. No palpable thrill.  Abdomen: Bowel sounds present and normoactive. Soft. Nondistended. Nontender. No sense of mass. No organomegaly.  Back: No spinal tenderness. No costovertebral angle tenderness.   Extremities: No cyanosis or clubbing. No edema.   Skin: Warm. Dry. Good turgor. Rash less prominent, still no desquamation. No vasculitic stigmata.  Psychiatric: Appropriate affect and mood for situation.     Laboratory Studies--  CBC                        13.8   8.80  )-----------( 162      ( 05 Mar 2021 08:09 )             45.3       Chemistries  03-05    141  |  97  |  9   ----------------------------<  88  3.3<L>   |  38<H>  |  0.57    Ca    7.8<L>      05 Mar 2021 08:09  Phos  2.1     03-04  Mg     1.6     03-04    TPro  4.8<L>  /  Alb  2.2<L>  /  TBili  1.1  /  DBili  x   /  AST  10<L>  /  ALT  24  /  AlkPhos  83  03-05      Culture Data    Culture - Urine (collected 02 Mar 2021 08:25)  Source: .Urine Clean Catch (Midstream)  Final Report (03 Mar 2021 10:12):    <10,000 CFU/mL Normal Urogenital Jesica    Culture - Blood (collected 02 Mar 2021 08:13)  Source: .Blood Blood-Venous  Preliminary Report (03 Mar 2021 09:57):    No growth to date.    Culture - Blood (collected 01 Mar 2021 23:06)  Source: .Blood Blood-Peripheral  Preliminary Report (03 Mar 2021 01:06):    No growth to date.    Culture - Blood (collected 01 Mar 2021 23:06)  Source: .Blood Blood-Peripheral  Preliminary Report (03 Mar 2021 01:06):    No growth to date.     [facility]  Division of Infectious Diseases  [phone]    Name: KERVIN CHOI  Age: 65y  Gender: Male  MRN: [id number]    Interval History--  Notes reviewed. Feeling ok. No fevers, chills, or rigors. Comfortable on NC. Denies pain. Rash not bothering him.     Past Medical History--  HLD (hyperlipidemia)    CHF (congestive heart failure)    COPD (chronic obstructive pulmonary disease)    HTN (hypertension)    No significant past surgical history        For details regarding the patient's social history, family history, and other miscellaneous elements, please refer the initial infectious diseases consultation and/or the admitting history and physical examination for this admission.    Allergies    Bananas (Unknown)  peanuts (Anaphylaxis)  penicillin (Short breath; Anaphylaxis)  piperacillin-tazobactam (Rash; Urticaria; Flushing; Hives)  Tomatoes (Unknown)    Intolerances        Medications--  Antibiotics:  cefepime   IVPB      cefepime   IVPB 2000 milliGRAM(s) IV Intermittent every 8 hours    Immunologic:    Other:  albuterol/ipratropium for Nebulization  atorvastatin  budesonide 160 MICROgram(s)/formoterol 4.5 MICROgram(s) Inhaler  enoxaparin Injectable  hydrocortisone  lactobacillus acidophilus  midodrine  ondansetron Injectable PRN  pantoprazole  Injectable  potassium phosphate / sodium phosphate Powder (PHOS-NaK)  risperiDONE   Tablet      Review of Systems--  A 10-point review of systems was obtained.   Review of systems otherwise negative except as previously noted.    Physical Examination--  Vital Signs: T(F): 97.5 (03-05-21 @ 06:21), Max: 97.6 (03-04-21 @ 11:32)  HR: 110 (03-05-21 @ 09:45)  BP: 119/74 (03-05-21 @ 06:21)  RR: 18 (03-05-21 @ 06:21)  SpO2: 90% (03-05-21 @ 09:45)  Wt(kg): --  General: Nontoxic-appearing Male in no acute distress.  HEENT: AT/NC. Anicteric. Conjunctiva pink and moist. Oropharynx clear  Neck: Not rigid. No sense of mass.  Nodes: None palpable.  Lungs: Diminished BS B   Heart: Regular rate and rhythm. No Murmur. No rub. No gallop. No palpable thrill.  Abdomen: Bowel sounds present and normoactive. Soft. Nondistended. Nontender. No sense of mass. No organomegaly.  Back: No spinal tenderness. No costovertebral angle tenderness.   Extremities: No cyanosis or clubbing. No edema.   Skin: Warm. Dry. Good turgor. Rash less prominent, still no desquamation. No vasculitic stigmata.  Psychiatric: Appropriate affect and mood for situation.     Laboratory Studies--  CBC                        13.8   8.80  )-----------( 162      ( 05 Mar 2021 08:09 )             45.3       Chemistries  03-05    141  |  97  |  9   ----------------------------<  88  3.3<L>   |  38<H>  |  0.57    Ca    7.8<L>      05 Mar 2021 08:09  Phos  2.1     03-04  Mg     1.6     03-04    TPro  4.8<L>  /  Alb  2.2<L>  /  TBili  1.1  /  DBili  x   /  AST  10<L>  /  ALT  24  /  AlkPhos  83  03-05      Culture Data    Culture - Urine (collected 02 Mar 2021 08:25)  Source: .Urine Clean Catch (Midstream)  Final Report (03 Mar 2021 10:12):    <10,000 CFU/mL Normal Urogenital Jesica    Culture - Blood (collected 02 Mar 2021 08:13)  Source: .Blood Blood-Venous  Preliminary Report (03 Mar 2021 09:57):    No growth to date.    Culture - Blood (collected 01 Mar 2021 23:06)  Source: .Blood Blood-Peripheral  Preliminary Report (03 Mar 2021 01:06):    No growth to date.    Culture - Blood (collected 01 Mar 2021 23:06)  Source: .Blood Blood-Peripheral  Preliminary Report (03 Mar 2021 01:06):    No growth to date.

## 2021-03-06 LAB
ALBUMIN SERPL ELPH-MCNC: 2.2 G/DL — LOW (ref 3.3–5)
ALP SERPL-CCNC: 85 U/L — SIGNIFICANT CHANGE UP (ref 40–120)
ALT FLD-CCNC: 19 U/L — SIGNIFICANT CHANGE UP (ref 12–78)
ANION GAP SERPL CALC-SCNC: 6 MMOL/L — SIGNIFICANT CHANGE UP (ref 5–17)
AST SERPL-CCNC: 9 U/L — LOW (ref 15–37)
BILIRUB SERPL-MCNC: 1.3 MG/DL — HIGH (ref 0.2–1.2)
BUN SERPL-MCNC: 9 MG/DL — SIGNIFICANT CHANGE UP (ref 7–23)
CALCIUM SERPL-MCNC: 8.2 MG/DL — LOW (ref 8.5–10.1)
CHLORIDE SERPL-SCNC: 98 MMOL/L — SIGNIFICANT CHANGE UP (ref 96–108)
CO2 SERPL-SCNC: 36 MMOL/L — HIGH (ref 22–31)
CREAT SERPL-MCNC: 0.64 MG/DL — SIGNIFICANT CHANGE UP (ref 0.5–1.3)
GLUCOSE SERPL-MCNC: 125 MG/DL — HIGH (ref 70–99)
HCT VFR BLD CALC: 45.4 % — SIGNIFICANT CHANGE UP (ref 39–50)
HGB BLD-MCNC: 13.9 G/DL — SIGNIFICANT CHANGE UP (ref 13–17)
MCHC RBC-ENTMCNC: 27.4 PG — SIGNIFICANT CHANGE UP (ref 27–34)
MCHC RBC-ENTMCNC: 30.6 GM/DL — LOW (ref 32–36)
MCV RBC AUTO: 89.5 FL — SIGNIFICANT CHANGE UP (ref 80–100)
NRBC # BLD: 0 /100 WBCS — SIGNIFICANT CHANGE UP (ref 0–0)
PLATELET # BLD AUTO: 173 K/UL — SIGNIFICANT CHANGE UP (ref 150–400)
POTASSIUM SERPL-MCNC: 3.5 MMOL/L — SIGNIFICANT CHANGE UP (ref 3.5–5.3)
POTASSIUM SERPL-SCNC: 3.5 MMOL/L — SIGNIFICANT CHANGE UP (ref 3.5–5.3)
PROT SERPL-MCNC: 5.4 GM/DL — LOW (ref 6–8.3)
RBC # BLD: 5.07 M/UL — SIGNIFICANT CHANGE UP (ref 4.2–5.8)
RBC # FLD: 21.1 % — HIGH (ref 10.3–14.5)
SODIUM SERPL-SCNC: 140 MMOL/L — SIGNIFICANT CHANGE UP (ref 135–145)
WBC # BLD: 10.98 K/UL — HIGH (ref 3.8–10.5)
WBC # FLD AUTO: 10.98 K/UL — HIGH (ref 3.8–10.5)

## 2021-03-06 PROCEDURE — 99233 SBSQ HOSP IP/OBS HIGH 50: CPT

## 2021-03-06 RX ADMIN — Medication 1 TABLET(S): at 11:04

## 2021-03-06 RX ADMIN — Medication 2 MILLIGRAM(S): at 14:41

## 2021-03-06 RX ADMIN — CEFEPIME 100 MILLIGRAM(S): 1 INJECTION, POWDER, FOR SOLUTION INTRAMUSCULAR; INTRAVENOUS at 22:11

## 2021-03-06 RX ADMIN — MIDODRINE HYDROCHLORIDE 10 MILLIGRAM(S): 2.5 TABLET ORAL at 14:41

## 2021-03-06 RX ADMIN — PANTOPRAZOLE SODIUM 40 MILLIGRAM(S): 20 TABLET, DELAYED RELEASE ORAL at 11:04

## 2021-03-06 RX ADMIN — Medication 10 MILLIGRAM(S): at 18:19

## 2021-03-06 RX ADMIN — Medication 1 PACKET(S): at 06:14

## 2021-03-06 RX ADMIN — RISPERIDONE 1 MILLIGRAM(S): 4 TABLET ORAL at 06:13

## 2021-03-06 RX ADMIN — ENOXAPARIN SODIUM 40 MILLIGRAM(S): 100 INJECTION SUBCUTANEOUS at 11:04

## 2021-03-06 RX ADMIN — MIDODRINE HYDROCHLORIDE 10 MILLIGRAM(S): 2.5 TABLET ORAL at 22:11

## 2021-03-06 RX ADMIN — Medication 3 MILLILITER(S): at 05:03

## 2021-03-06 RX ADMIN — BUDESONIDE AND FORMOTEROL FUMARATE DIHYDRATE 2 PUFF(S): 160; 4.5 AEROSOL RESPIRATORY (INHALATION) at 18:19

## 2021-03-06 RX ADMIN — Medication 10 MILLIGRAM(S): at 06:13

## 2021-03-06 RX ADMIN — Medication 2 MILLIGRAM(S): at 22:12

## 2021-03-06 RX ADMIN — CEFEPIME 100 MILLIGRAM(S): 1 INJECTION, POWDER, FOR SOLUTION INTRAMUSCULAR; INTRAVENOUS at 06:13

## 2021-03-06 RX ADMIN — Medication 2 MILLIGRAM(S): at 18:50

## 2021-03-06 RX ADMIN — CEFEPIME 100 MILLIGRAM(S): 1 INJECTION, POWDER, FOR SOLUTION INTRAMUSCULAR; INTRAVENOUS at 14:41

## 2021-03-06 RX ADMIN — Medication 3 MILLILITER(S): at 17:37

## 2021-03-06 RX ADMIN — ATORVASTATIN CALCIUM 80 MILLIGRAM(S): 80 TABLET, FILM COATED ORAL at 22:12

## 2021-03-06 RX ADMIN — BUDESONIDE AND FORMOTEROL FUMARATE DIHYDRATE 2 PUFF(S): 160; 4.5 AEROSOL RESPIRATORY (INHALATION) at 06:12

## 2021-03-06 RX ADMIN — Medication 3 MILLILITER(S): at 10:22

## 2021-03-06 RX ADMIN — MIDODRINE HYDROCHLORIDE 10 MILLIGRAM(S): 2.5 TABLET ORAL at 06:14

## 2021-03-06 NOTE — PROGRESS NOTE ADULT - SUBJECTIVE AND OBJECTIVE BOX
Patient is a 65y old  Male who presents with a chief complaint of syncope, sepsis (05 Mar 2021 13:00)    INTERVAL HPI/OVERNIGHT EVENTS:  Pt was seen and examined, no acute events.    MEDICATIONS  (STANDING):  albuterol/ipratropium for Nebulization 3 milliLiter(s) Nebulizer every 6 hours  atorvastatin 80 milliGRAM(s) Oral at bedtime  budesonide 160 MICROgram(s)/formoterol 4.5 MICROgram(s) Inhaler 2 Puff(s) Inhalation two times a day  cefepime   IVPB 2000 milliGRAM(s) IV Intermittent every 8 hours  cefepime   IVPB      enoxaparin Injectable 40 milliGRAM(s) SubCutaneous daily  hydrocortisone 10 milliGRAM(s) Oral two times a day  lactobacillus acidophilus 1 Tablet(s) Oral daily  midodrine 10 milliGRAM(s) Oral every 8 hours  pantoprazole  Injectable 40 milliGRAM(s) IV Push daily  risperiDONE   Tablet 1 milliGRAM(s) Oral two times a day    MEDICATIONS  (PRN):  loperamide 2 milliGRAM(s) Oral every 4 hours PRN Diarrhea  ondansetron Injectable 4 milliGRAM(s) IV Push every 6 hours PRN Nausea and/or Vomiting      Allergies  Bananas (Unknown)  peanuts (Anaphylaxis)  penicillin (Short breath; Anaphylaxis)  piperacillin-tazobactam (Rash; Urticaria; Flushing; Hives)  Tomatoes (Unknown)        Vital Signs Last 24 Hrs  T(C): 36.3 (06 Mar 2021 11:21), Max: 36.7 (06 Mar 2021 06:03)  T(F): 97.4 (06 Mar 2021 11:21), Max: 98 (06 Mar 2021 06:03)  HR: 100 (06 Mar 2021 11:21) (90 - 115)  BP: 101/57 (06 Mar 2021 11:21) (101/57 - 114/74)  BP(mean): --  RR: 17 (06 Mar 2021 11:21) (17 - 18)  SpO2: 93% (06 Mar 2021 11:21) (90% - 96%)      PHYSICAL EXAM:  GENERAL: NAD  HEAD:  Atraumatic   EYES: PERRLA  NERVOUS SYSTEM:  Awake, alert  CHEST/LUNG: Clear  HEART: RRR  ABDOMEN: Soft, non tender  EXTREMITIES: no edema     LABS:                        13.9   10.98 )-----------( 173      ( 06 Mar 2021 08:13 )             45.4     03-06    140  |  98  |  9   ----------------------------<  125<H>  3.5   |  36<H>  |  0.64    Ca    8.2<L>      06 Mar 2021 08:13    TPro  5.4<L>  /  Alb  2.2<L>  /  TBili  1.3<H>  /  DBili  x   /  AST  9<L>  /  ALT  19  /  AlkPhos  85  03-06      CAPILLARY BLOOD GLUCOSE      Culture - Urine (collected 02 Mar 2021 08:25)  Source: .Urine Clean Catch (Midstream)  Final Report (03 Mar 2021 10:12):    <10,000 CFU/mL Normal Urogenital Jesica    Culture - Blood (collected 02 Mar 2021 08:13)  Source: .Blood Blood-Venous  Preliminary Report (03 Mar 2021 09:57):    No growth to date.    Culture - Blood (collected 01 Mar 2021 23:06)  Source: .Blood Blood-Peripheral  Preliminary Report (03 Mar 2021 01:06):    No growth to date.    Culture - Blood (collected 01 Mar 2021 23:06)  Source: .Blood Blood-Peripheral  Preliminary Report (03 Mar 2021 01:06):    No growth to date.      RADIOLOGY & ADDITIONAL TESTS:    Imaging Personally Reviewed:  [ ] YES  [ ] NO    Consultant(s) Notes Reviewed:  [ ] YES  [ ] NO    Care Discussed with Consultants/Other Providers [ ] YES  [ ] NO

## 2021-03-07 LAB
CULTURE RESULTS: SIGNIFICANT CHANGE UP
SPECIMEN SOURCE: SIGNIFICANT CHANGE UP

## 2021-03-07 PROCEDURE — 99232 SBSQ HOSP IP/OBS MODERATE 35: CPT

## 2021-03-07 RX ADMIN — ATORVASTATIN CALCIUM 80 MILLIGRAM(S): 80 TABLET, FILM COATED ORAL at 22:02

## 2021-03-07 RX ADMIN — MIDODRINE HYDROCHLORIDE 10 MILLIGRAM(S): 2.5 TABLET ORAL at 14:10

## 2021-03-07 RX ADMIN — Medication 10 MILLIGRAM(S): at 17:20

## 2021-03-07 RX ADMIN — Medication 2 MILLIGRAM(S): at 14:10

## 2021-03-07 RX ADMIN — Medication 3 MILLILITER(S): at 06:22

## 2021-03-07 RX ADMIN — PANTOPRAZOLE SODIUM 40 MILLIGRAM(S): 20 TABLET, DELAYED RELEASE ORAL at 11:59

## 2021-03-07 RX ADMIN — CEFEPIME 100 MILLIGRAM(S): 1 INJECTION, POWDER, FOR SOLUTION INTRAMUSCULAR; INTRAVENOUS at 05:37

## 2021-03-07 RX ADMIN — Medication 1 TABLET(S): at 11:59

## 2021-03-07 RX ADMIN — Medication 2 MILLIGRAM(S): at 18:35

## 2021-03-07 RX ADMIN — Medication 3 MILLILITER(S): at 17:01

## 2021-03-07 RX ADMIN — MIDODRINE HYDROCHLORIDE 10 MILLIGRAM(S): 2.5 TABLET ORAL at 05:37

## 2021-03-07 RX ADMIN — BUDESONIDE AND FORMOTEROL FUMARATE DIHYDRATE 2 PUFF(S): 160; 4.5 AEROSOL RESPIRATORY (INHALATION) at 05:36

## 2021-03-07 RX ADMIN — Medication 10 MILLIGRAM(S): at 05:37

## 2021-03-07 RX ADMIN — Medication 3 MILLILITER(S): at 11:26

## 2021-03-07 RX ADMIN — ENOXAPARIN SODIUM 40 MILLIGRAM(S): 100 INJECTION SUBCUTANEOUS at 11:59

## 2021-03-07 RX ADMIN — MIDODRINE HYDROCHLORIDE 10 MILLIGRAM(S): 2.5 TABLET ORAL at 22:02

## 2021-03-07 RX ADMIN — Medication 3 MILLILITER(S): at 01:20

## 2021-03-07 RX ADMIN — BUDESONIDE AND FORMOTEROL FUMARATE DIHYDRATE 2 PUFF(S): 160; 4.5 AEROSOL RESPIRATORY (INHALATION) at 17:20

## 2021-03-07 RX ADMIN — Medication 3 MILLILITER(S): at 23:48

## 2021-03-07 NOTE — PROGRESS NOTE ADULT - SUBJECTIVE AND OBJECTIVE BOX
Patient is a 65y old  Male who presents with a chief complaint of syncope, sepsis (06 Mar 2021 13:49)    INTERVAL HPI/OVERNIGHT EVENTS:  Pt was seen and examined, no acute events/    MEDICATIONS  (STANDING):  albuterol/ipratropium for Nebulization 3 milliLiter(s) Nebulizer every 6 hours  atorvastatin 80 milliGRAM(s) Oral at bedtime  budesonide 160 MICROgram(s)/formoterol 4.5 MICROgram(s) Inhaler 2 Puff(s) Inhalation two times a day  enoxaparin Injectable 40 milliGRAM(s) SubCutaneous daily  hydrocortisone 10 milliGRAM(s) Oral two times a day  lactobacillus acidophilus 1 Tablet(s) Oral daily  midodrine 10 milliGRAM(s) Oral every 8 hours  pantoprazole  Injectable 40 milliGRAM(s) IV Push daily  risperiDONE   Tablet 1 milliGRAM(s) Oral two times a day    MEDICATIONS  (PRN):  loperamide 2 milliGRAM(s) Oral every 4 hours PRN Diarrhea  ondansetron Injectable 4 milliGRAM(s) IV Push every 6 hours PRN Nausea and/or Vomiting      Allergies    Bananas (Unknown)  peanuts (Anaphylaxis)  penicillin (Short breath; Anaphylaxis)  piperacillin-tazobactam (Rash; Urticaria; Flushing; Hives)  Tomatoes (Unknown)  walnut (Anaphylaxis)    Intolerances          Vital Signs Last 24 Hrs  T(C): 36.3 (07 Mar 2021 11:03), Max: 36.6 (06 Mar 2021 16:00)  T(F): 97.3 (07 Mar 2021 11:03), Max: 97.8 (06 Mar 2021 16:00)  HR: 95 (07 Mar 2021 12:17) (82 - 104)  BP: 102/70 (07 Mar 2021 11:03) (102/67 - 111/74)  BP(mean): --  RR: 17 (07 Mar 2021 11:03) (16 - 18)  SpO2: 95% (07 Mar 2021 12:17) (94% - 96%)    PHYSICAL EXAM:  GENERAL: NAD  HEAD:  Atraumatic   EYES: PERRLA  NERVOUS SYSTEM:  Awake, alert  CHEST/LUNG: Clear  HEART: RRR  ABDOMEN: Soft, non tender  EXTREMITIES: no edema     LABS:                        13.9   10.98 )-----------( 173      ( 06 Mar 2021 08:13 )             45.4     03-06    140  |  98  |  9   ----------------------------<  125<H>  3.5   |  36<H>  |  0.64    Ca    8.2<L>      06 Mar 2021 08:13    TPro  5.4<L>  /  Alb  2.2<L>  /  TBili  1.3<H>  /  DBili  x   /  AST  9<L>  /  ALT  19  /  AlkPhos  85  03-06      CAPILLARY BLOOD GLUCOSE      RADIOLOGY & ADDITIONAL TESTS:    Imaging Personally Reviewed:  [ ] YES  [ ] NO    Consultant(s) Notes Reviewed:  [ ] YES  [ ] NO    Care Discussed with Consultants/Other Providers [ ] YES  [ ] NO

## 2021-03-07 NOTE — PROGRESS NOTE ADULT - ASSESSMENT
65M PMH CAD, HFpEF,  HLD, HTN, COPD, Nicotine dependence , GERD, crohn's s/p colectomy, prior alcohol use, ? psychiatry d/o was BIBEMS s/p syncopal episode with agonal breathing at nursing home. Per EMS pt at nursing home was hypoxic to the 70s and hypotensive BP was 50/40. Per nursing home documentation pt had just completed a course of moxifloxacin X  5 days and was prescribed meropenum and vancomycin on the day of admission. Pt in ED febrile to 101 with complaints of chills. WBC 67/35. s/p 2L IVF bolus and albumin without improvement in BP. Started on levophed for hemodynamic support. Lactate 2.6. Admit to ICU for severe sepsis with septic shock, PNA, hypoxia.     1. NEURO  - awake, alert, oriented    2. CV  - septic shock likely due to underlying PNA  - weaned off levophed yesterday night  - cont with PO midodrine for BP support, wean off midodrine as BP improves  - was started on stress dose steroids as pt ?chronically on steroids for underlying crohn's/COPD; will d/c stress steroids and resume home dose of steroids as listed in nursing home chart  - nocturnal BiPAP for underlying COPD which pt uses at home      3. PULM  - HCAP: cover for nosocomial infections as pt is from nursing facility  - cont with vanco and cefepime  - check sputum cx if able to expectorate and provide sputum sample  - check MRSA nares (if negative can d/c vanco as long as cultures also without staph)  - o2 supplementation  - CTA chest without PE, + R infiltrate  - duonebs for underlying COPD    4. GI  - noted with GB wall edema and pericholecystic fluid: abdominal exam benign without any tenderness or Roche's sign  - lab values also don't really reflect GB process severe enough to cause sepsis: mild transaminitis, with initial mild bump in T bili which has normalized  - HIDA negative  - initial transaminitis likely related to component of mild ischemic injury to liver with hypotension and shock state   - nursing home reports that pt is on a puree diet and honey thickened fluids: pt states he tolerates "regular food", will get speech and swallow eval, for now cont with puree and honey thickened fluids     5. ID  - sepsis likely due to pulmonary source  - blood and urine cx negative to date  - pt has not brought up sputum to send for culture  - follow up urine legionella   - cont vanco/cefepime covering HCAP, gram negative organisms  - if MRSA nares negative and cultures negative for staph can d/c vanco  - flagyl d/kerri with negative HIDA  - pt had reaction to zosyn: skin flushing, SOB: treated with benadryl with improvement  - tolerating cefepime without rash or respiratory complaints  - pt with diarrhea: may be related to underlying IBD with Crohn's however will check stool for r/o C diff as pt was in the hospital recently at [facility] and is from a nursing facility     6. GEN  - supplement hypokalemia and hypophosphatemia   - stable for transfer to medical floor  - physical therapy  - d/c russell and do a trial of void  - d/w patient      
65M PMH CAD, HFpEF,  HLD, HTN, COPD, Nicotine dependence , GERD, crohn's s/p colectomy, prior alcohol use, ? psychiatry d/o was BIBEMS s/p syncopal episode with agonal breathing at nursing home. Per EMS pt at nursing home was hypoxic to the 70s and hypotensive BP was 50/40. Per nursing home documentation pt had just completed a course of moxifloxacin X  5 days and was prescribed meropenum and vancomycin on the day of admission. Pt in ED febrile to 101 with complaints of chills. WBC 67/35. s/p 2L IVF bolus and albumin without improvement in BP. Started on levophed for hemodynamic support. Lactate 2.6. Initially admitted to ICU for severe sepsis with septic shock, PNA, hypoxia.     Septic shock POA:  - septic shock likely due to underlying PNA  - weaned off levophed, cont with PO midodrine for BP support, wean off midodrine as BP improves  - was started on stress dose steroids as pt ?chronically on steroids for underlying crohn's/COPD; stress steroids stopped and resume home dose of steroids as listed in nursing home chart  - nocturnal BiPAP for underlying COPD which pt uses at home    Acute hypoxic respiratory failure:  - On 4L NC  - Secondary to HCAP.    HCAP:   - vanc stopped, MRSA neg  - Completed cefepime today  - OI2 supplementation  - CTA chest without PE, + R infiltrate  - Neg urine legionella   - duonebs for underlying COPD  - blood and urine cx negative to date    H/O Crohn's disease:  - noted with GB wall edema and pericholecystic fluid: abdominal exam benign without any tenderness or Roche's sign, HIDA negative, acute shefali ruled out  - lab values also don't really reflect GB process severe enough to cause sepsis: mild transaminitis, with initial mild bump in T bili which has normalized  - initial transaminitis likely related to component of mild ischemic injury to liver with hypotension and shock state   - SLP done, on dysphagia diet  - pt with diarrhea, may be related to underlying IBD with Crohn's, C diff neg  - On steroid, unclear what dose he was on, NH paper has multiple steroid regimen.    Chronic diastolic CHF:  - Volume status stable    HTN:  - BOP meds held for hypotension  - Wean off midodrine as tolerated     HLD:  - Continue statin    COPD:  - On 4L NC, continue to taper down  - Continue current meds    Electrolyte imbalance:  - supplemented hypokalemia and hypophosphatemia     Drug rash:  - Likely secondary to zosyn  - Now stable    DVT ppx      Will need home O2
65M PMH CAD, HFpEF,  HLD, HTN, COPD, Nicotine dependence , GERD, crohn's s/p colectomy, prior alcohol use, ? psychiatry d/o was BIBEMS s/p syncopal episode with agonal breathing at nursing home. Per EMS pt at nursing home was hypoxic to the 70s and hypotensive BP was 50/40. Per nursing home documentation pt had just completed a course of moxifloxacin X  5 days and was prescribed meropenum and vancomycin on the day of admission. Pt in ED febrile to 101 with complaints of chills. WBC 67/35. s/p 2L IVF bolus and albumin without improvement in BP. Started on levophed for hemodynamic support. Lactate 2.6. Initially admitted to ICU for severe sepsis with septic shock, PNA, hypoxia.     Septic shock POA:  - septic shock likely due to underlying PNA  - weaned off levophed, cont with PO midodrine for BP support, wean off midodrine as BP improves  - was started on stress dose steroids as pt ?chronically on steroids for underlying crohn's/COPD; stress steroids stopped and resume home dose of steroids as listed in nursing home chart  - nocturnal BiPAP for underlying COPD which pt uses at home    Acute hypoxic respiratory failure:  - On 4L NC  - Secondary to HCAP.    HCAP:   - Cont cefepime, vanc stopped, MRSA neg  - OI2 supplementation  - CTA chest without PE, + R infiltrate  - follow up urine legionella   - duonebs for underlying COPD  - blood and urine cx negative to date    H/O Crohn's disease:  - noted with GB wall edema and pericholecystic fluid: abdominal exam benign without any tenderness or Roche's sign, HIDA negative, acute shefali ruled out  - lab values also don't really reflect GB process severe enough to cause sepsis: mild transaminitis, with initial mild bump in T bili which has normalized  - initial transaminitis likely related to component of mild ischemic injury to liver with hypotension and shock state   - nursing home reports that pt is on a puree diet and honey thickened fluids: pt states he tolerates "regular food", will get speech and swallow eval, for now cont with puree and honey thickened fluids   - pt with diarrhea, may be related to underlying IBD with Crohn's, C diff neg    Chronic diastolic CHF:  - Volume status stable    HTN:  - BOP meds held for hypotension  - Wean off midodrine as tolerated     HLD:  - Continue statin    COPD:  - On 4L NC, continue to taper down  - Continue current meds    Electrolyte imbalance:  - supplemented hypokalemia and hypophosphatemia     Drug rash:  - Likely secondary to zosyn  - Now stable    DVT ppx      
65M PMH CAD, HFpEF,  HLD, HTN, COPD, Nicotine dependence , GERD, crohn's s/p colectomy, prior alcohol use, ? psychiatry d/o was BIBEMS s/p syncopal episode with agonal breathing at nursing home. Per EMS pt at nursing home was hypoxic to the 70s and hypotensive BP was 50/40. Per nursing home documentation pt had just completed a course of moxifloxacin X  5 days and was prescribed meropenum and vancomycin on the day of admission. Pt in ED febrile to 101 with complaints of chills. WBC 67/35. s/p 2L IVF bolus and albumin without improvement in BP. Started on levophed for hemodynamic support. Lactate 2.6. Initially admitted to ICU for severe sepsis with septic shock, PNA, hypoxia.     Septic shock POA:  - septic shock likely due to underlying PNA  - weaned off levophed, cont with PO midodrine for BP support, wean off midodrine as BP improves  - was started on stress dose steroids as pt ?chronically on steroids for underlying crohn's/COPD; stress steroids stopped and resume home dose of steroids as listed in nursing home chart  - nocturnal BiPAP for underlying COPD which pt uses at home    Acute hypoxic respiratory failure:  - On 4L NC  - Secondary to HCAP.    HCAP:   - Cont cefepime, vanc stopped, MRSA neg  - OI2 supplementation  - CTA chest without PE, + R infiltrate  - follow up urine legionella   - duonebs for underlying COPD  - blood and urine cx negative to date    H/O Crohn's disease:  - noted with GB wall edema and pericholecystic fluid: abdominal exam benign without any tenderness or Roche's sign, HIDA negative, acute shefali ruled out  - lab values also don't really reflect GB process severe enough to cause sepsis: mild transaminitis, with initial mild bump in T bili which has normalized  - initial transaminitis likely related to component of mild ischemic injury to liver with hypotension and shock state   - SLP done, on dysphagia diet  - pt with diarrhea, may be related to underlying IBD with Crohn's, C diff neg  - On steroid, unclear what dose he was on, NH paper has multiple steroid regimen.    Chronic diastolic CHF:  - Volume status stable    HTN:  - BOP meds held for hypotension  - Wean off midodrine as tolerated     HLD:  - Continue statin    COPD:  - On 4L NC, continue to taper down  - Continue current meds    Electrolyte imbalance:  - supplemented hypokalemia and hypophosphatemia     Drug rash:  - Likely secondary to zosyn  - Now stable    DVT ppx      
64 y/o M PMH CHF, COPD, HTN, HLD, Crohns, admitted s/p syncope, with resp distress, now saturating well on 6L NC. RUL PNA. Leukocytosis, improved from 26 --> 12.82  Abd US shows possible acute cholecystitis, however HIDA negative. - Roche's sign  Continue medical management per ICU team  No surgical intervention at this time  Reconsult as needed.  
Drug Rash  On rx vs. HCAP and COPD.  MRSA PCR nares negative, would stop vancomycin  Rash may continue to evolve even if offending agent stopped, making management difficult  No concern of SJS at this time    3/5: Overall stable to improved.     Suggestions--  Continue cefepime, hope to limit to 5-7 days total Rx  Decrease steroids as able  Monitor rash    Dr. Carmen Lagunas covering the service this weekend. Please call [phone] for any ID issues that need attention     Jhonatan Schwartz MD  Attending Physician  [facility]  Division of Infectious Diseases  [phone]
65M PMH CAD, HFpEF,  HLD, HTN, COPD, Nicotine dependence , GERD, crohn's s/p colectomy, prior alcohol use, ? psychiatry d/o was BIBEMS s/p syncopal episode with agonal breathing at nursing home. Per EMS pt at nursing home was hypoxic to the 70s and hypotensive BP was 50/40. Per nursing home documentation pt had just completed a course of moxifloxacin X  5 days and was prescribed meropenum and vancomycin on the day of admission. Pt in ED febrile to 101 with complaints of chills. WBC 67/35. s/p 2L IVF bolus and albumin without improvement in BP. Started on levophed for hemodynamic support. Lactate 2.6. Initially admitted to ICU for severe sepsis with septic shock, PNA, hypoxia.     Septic shock POA:  - septic shock likely due to underlying PNA  - weaned off levophed, cont with PO midodrine for BP support, wean off midodrine as BP improves  - was started on stress dose steroids as pt ?chronically on steroids for underlying crohn's/COPD; stress steroids stopped and resume home dose of steroids as listed in nursing home chart  - nocturnal BiPAP for underlying COPD which pt uses at home    Acute hypoxic respiratory failure:  - On 4L NC  - Secondary to HCAP.    HCAP:   - Cont cefepime, vanc stopped, MRSA neg  - OI2 supplementation  - CTA chest without PE, + R infiltrate  - follow up urine legionella   - duonebs for underlying COPD  - blood and urine cx negative to date    H/O Crohn's disease:  - noted with GB wall edema and pericholecystic fluid: abdominal exam benign without any tenderness or Roche's sign, HIDA negative, acute shefali ruled out  - lab values also don't really reflect GB process severe enough to cause sepsis: mild transaminitis, with initial mild bump in T bili which has normalized  - initial transaminitis likely related to component of mild ischemic injury to liver with hypotension and shock state   - SLP done, on dysphagia diet  - pt with diarrhea, may be related to underlying IBD with Crohn's, C diff neg  - On steroid, unclear what dose he was on, NH paper has multiple steroid regimen.    Chronic diastolic CHF:  - Volume status stable    HTN:  - BOP meds held for hypotension  - Wean off midodrine as tolerated     HLD:  - Continue statin    COPD:  - On 4L NC, continue to taper down  - Continue current meds    Electrolyte imbalance:  - supplemented hypokalemia and hypophosphatemia     Drug rash:  - Likely secondary to zosyn  - Now stable    DVT ppx

## 2021-03-07 NOTE — PROGRESS NOTE ADULT - NUTRITIONAL ASSESSMENT
This patient has been assessed with a concern for Malnutrition and has been determined to have a diagnosis/diagnoses of Moderate protein-calorie malnutrition.    This patient is being managed with:   Diet Soft-  Fiber/Residue Restricted  Low Sodium  Entered: Mar  3 2021 12:22PM    
This patient has been assessed with a concern for Malnutrition and has been determined to have a diagnosis/diagnoses of Moderate protein-calorie malnutrition.    This patient is being managed with:   Diet Pureed-  Fiber/Residue Restricted  Honey Consistency (HONCON)  Entered: Mar  3 2021  1:22PM    The following pending diet order is being considered for treatment of Moderate protein-calorie malnutrition:  Diet Soft-  Fiber/Residue Restricted  Low Sodium  Entered: Mar  3 2021 12:22PM  
This patient has been assessed with a concern for Malnutrition and has been determined to have a diagnosis/diagnoses of Moderate protein-calorie malnutrition.    This patient is being managed with:   Diet Soft-  Fiber/Residue Restricted  Low Sodium  Entered: Mar  3 2021 12:22PM

## 2021-03-07 NOTE — PROGRESS NOTE ADULT - REASON FOR ADMISSION
syncope, sepsis

## 2021-03-08 ENCOUNTER — TRANSCRIPTION ENCOUNTER (OUTPATIENT)
Age: 66
End: 2021-03-08

## 2021-03-08 VITALS — OXYGEN SATURATION: 96 %

## 2021-03-08 LAB
ALBUMIN SERPL ELPH-MCNC: 2.1 G/DL — LOW (ref 3.3–5)
ALP SERPL-CCNC: 77 U/L — SIGNIFICANT CHANGE UP (ref 40–120)
ALT FLD-CCNC: 18 U/L — SIGNIFICANT CHANGE UP (ref 12–78)
ANION GAP SERPL CALC-SCNC: 6 MMOL/L — SIGNIFICANT CHANGE UP (ref 5–17)
AST SERPL-CCNC: 8 U/L — LOW (ref 15–37)
BILIRUB SERPL-MCNC: 1 MG/DL — SIGNIFICANT CHANGE UP (ref 0.2–1.2)
BUN SERPL-MCNC: 12 MG/DL — SIGNIFICANT CHANGE UP (ref 7–23)
CALCIUM SERPL-MCNC: 7.9 MG/DL — LOW (ref 8.5–10.1)
CHLORIDE SERPL-SCNC: 100 MMOL/L — SIGNIFICANT CHANGE UP (ref 96–108)
CO2 SERPL-SCNC: 34 MMOL/L — HIGH (ref 22–31)
CREAT SERPL-MCNC: 0.53 MG/DL — SIGNIFICANT CHANGE UP (ref 0.5–1.3)
FLUAV AG NPH QL: SIGNIFICANT CHANGE UP
FLUBV AG NPH QL: SIGNIFICANT CHANGE UP
GLUCOSE SERPL-MCNC: 86 MG/DL — SIGNIFICANT CHANGE UP (ref 70–99)
HCT VFR BLD CALC: 44.1 % — SIGNIFICANT CHANGE UP (ref 39–50)
HGB BLD-MCNC: 13.1 G/DL — SIGNIFICANT CHANGE UP (ref 13–17)
MCHC RBC-ENTMCNC: 27.1 PG — SIGNIFICANT CHANGE UP (ref 27–34)
MCHC RBC-ENTMCNC: 29.7 GM/DL — LOW (ref 32–36)
MCV RBC AUTO: 91.1 FL — SIGNIFICANT CHANGE UP (ref 80–100)
NRBC # BLD: 0 /100 WBCS — SIGNIFICANT CHANGE UP (ref 0–0)
PLATELET # BLD AUTO: 239 K/UL — SIGNIFICANT CHANGE UP (ref 150–400)
POTASSIUM SERPL-MCNC: 3.6 MMOL/L — SIGNIFICANT CHANGE UP (ref 3.5–5.3)
POTASSIUM SERPL-SCNC: 3.6 MMOL/L — SIGNIFICANT CHANGE UP (ref 3.5–5.3)
PROCALCITONIN SERPL-MCNC: 0.46 NG/ML — HIGH (ref 0.02–0.1)
PROT SERPL-MCNC: 5 GM/DL — LOW (ref 6–8.3)
RBC # BLD: 4.84 M/UL — SIGNIFICANT CHANGE UP (ref 4.2–5.8)
RBC # FLD: 21.3 % — HIGH (ref 10.3–14.5)
SARS-COV-2 RNA SPEC QL NAA+PROBE: SIGNIFICANT CHANGE UP
SODIUM SERPL-SCNC: 140 MMOL/L — SIGNIFICANT CHANGE UP (ref 135–145)
WBC # BLD: 14.05 K/UL — HIGH (ref 3.8–10.5)
WBC # FLD AUTO: 14.05 K/UL — HIGH (ref 3.8–10.5)

## 2021-03-08 PROCEDURE — 99239 HOSP IP/OBS DSCHRG MGMT >30: CPT

## 2021-03-08 RX ORDER — MIDODRINE HYDROCHLORIDE 2.5 MG/1
1 TABLET ORAL
Qty: 0 | Refills: 0 | DISCHARGE
Start: 2021-03-08

## 2021-03-08 RX ORDER — PANTOPRAZOLE SODIUM 20 MG/1
1 TABLET, DELAYED RELEASE ORAL
Qty: 0 | Refills: 0 | DISCHARGE
Start: 2021-03-08

## 2021-03-08 RX ORDER — POTASSIUM CHLORIDE 20 MEQ
40 PACKET (EA) ORAL ONCE
Refills: 0 | Status: DISCONTINUED | OUTPATIENT
Start: 2021-03-08 | End: 2021-03-08

## 2021-03-08 RX ORDER — LOPERAMIDE HCL 2 MG
1 TABLET ORAL
Qty: 0 | Refills: 0 | DISCHARGE
Start: 2021-03-08

## 2021-03-08 RX ORDER — LACTOBACILLUS ACIDOPHILUS 100MM CELL
0 CAPSULE ORAL
Qty: 0 | Refills: 0 | DISCHARGE
Start: 2021-03-08

## 2021-03-08 RX ORDER — METOPROLOL TARTRATE 50 MG
1 TABLET ORAL
Qty: 0 | Refills: 0 | DISCHARGE

## 2021-03-08 RX ADMIN — RISPERIDONE 1 MILLIGRAM(S): 4 TABLET ORAL at 05:38

## 2021-03-08 RX ADMIN — ENOXAPARIN SODIUM 40 MILLIGRAM(S): 100 INJECTION SUBCUTANEOUS at 11:06

## 2021-03-08 RX ADMIN — MIDODRINE HYDROCHLORIDE 10 MILLIGRAM(S): 2.5 TABLET ORAL at 05:37

## 2021-03-08 RX ADMIN — Medication 10 MILLIGRAM(S): at 17:32

## 2021-03-08 RX ADMIN — BUDESONIDE AND FORMOTEROL FUMARATE DIHYDRATE 2 PUFF(S): 160; 4.5 AEROSOL RESPIRATORY (INHALATION) at 17:33

## 2021-03-08 RX ADMIN — BUDESONIDE AND FORMOTEROL FUMARATE DIHYDRATE 2 PUFF(S): 160; 4.5 AEROSOL RESPIRATORY (INHALATION) at 05:37

## 2021-03-08 RX ADMIN — Medication 2 MILLIGRAM(S): at 11:06

## 2021-03-08 RX ADMIN — Medication 3 MILLILITER(S): at 17:11

## 2021-03-08 RX ADMIN — MIDODRINE HYDROCHLORIDE 10 MILLIGRAM(S): 2.5 TABLET ORAL at 13:50

## 2021-03-08 RX ADMIN — Medication 1 TABLET(S): at 11:06

## 2021-03-08 RX ADMIN — Medication 3 MILLILITER(S): at 12:22

## 2021-03-08 RX ADMIN — Medication 3 MILLILITER(S): at 06:33

## 2021-03-08 RX ADMIN — Medication 2 MILLIGRAM(S): at 05:37

## 2021-03-08 RX ADMIN — Medication 10 MILLIGRAM(S): at 05:37

## 2021-03-08 RX ADMIN — PANTOPRAZOLE SODIUM 40 MILLIGRAM(S): 20 TABLET, DELAYED RELEASE ORAL at 11:06

## 2021-03-08 NOTE — DISCHARGE NOTE PROVIDER - NSDCMRMEDTOKEN_GEN_ALL_CORE_FT
Advair Diskus 500 mcg-50 mcg inhalation powder: 1 puff(s) inhaled 2 times a day  albuterol 1.25 mg/3 mL (0.042%) inhalation solution: 3 milliliter(s) inhaled 3 times a day  calcium carbonate 500 mg (200 mg elemental calcium) oral tablet, chewable: 1 tab(s) orally 2 times a day  hydrocortisone 10 mg oral tablet: 1 tab(s) orally 2 times a day  lactobacillus acidophilus oral capsule:  orally   Lipitor 80 mg oral tablet: 1 tab(s) orally once a day  loperamide 2 mg oral capsule: 1 cap(s) orally every 4 hours, As needed, Diarrhea  midodrine 10 mg oral tablet: 1 tab(s) orally every 8 hours  pantoprazole 40 mg oral delayed release tablet: 1 tab(s) orally once a day  RisperDAL 1 mg oral tablet: 1 tab(s) orally 2 times a day

## 2021-03-08 NOTE — DISCHARGE NOTE PROVIDER - HOSPITAL COURSE
65M PMH CAD, HFpEF,  HLD, HTN, COPD, Nicotine dependence , GERD, crohn's s/p colectomy, prior alcohol use, ? psychiatry d/o was BIBEMS s/p syncopal episode with agonal breathing at nursing home. Per EMS pt at nursing home was hypoxic to the 70s and hypotensive BP was 50/40. Per nursing home documentation pt had just completed a course of moxifloxacin X  5 days and was prescribed meropenum and vancomycin on the day of admission. Pt in ED febrile to 101 with complaints of chills. WBC 67/35. s/p 2L IVF bolus and albumin without improvement in BP. Started on levophed for hemodynamic support. Lactate 2.6. Initially admitted to ICU for severe sepsis with septic shock, PNA, hypoxia.     Septic shock POA:  - septic shock likely due to underlying PNA  - weaned off levophed, cont with PO midodrine for BP support, wean off midodrine as BP improves  - was started on stress dose steroids as pt ?chronically on steroids for underlying crohn's/COPD; stress steroids stopped and resume home dose of steroids as listed in nursing home chart  - nocturnal BiPAP for underlying COPD which pt uses at home    Acute hypoxic respiratory failure:  - On 3L NC  - Secondary to HCAP and COPD    HCAP:   - vanc stopped, MRSA neg  - Completed cefepime  - O2 supplementation  - CTA chest without PE, + R infiltrate  - Neg urine legionella   - duonebs for underlying COPD  - blood and urine cx negative to date    H/O Crohn's disease:  - noted with GB wall edema and pericholecystic fluid: abdominal exam benign without any tenderness or Roche's sign, HIDA negative, acute shefali ruled out  - lab values also don't really reflect GB process severe enough to cause sepsis: mild transaminitis, with initial mild bump in T bili which has normalized  - initial transaminitis likely related to component of mild ischemic injury to liver with hypotension and shock state   - SLP done, on dysphagia diet  - pt with diarrhea, may be related to underlying IBD with Crohn's, C diff neg  - On steroid, unclear what dose he was on, NH paper has multiple steroid regimen. can resume the chronic dose on dc .    Chronic diastolic CHF:  - Volume status stable    HTN:  - BP meds held for hypotension  - Wean off midodrine as tolerated     HLD:  - Continue statin    COPD:  - On 4L NC, continue to taper down  - Continue current meds    Electrolyte imbalance:  - supplemented hypokalemia and hypophosphatemia     Drug rash:  - Likely secondary to zosyn  - Now stable    DVT ppx

## 2021-03-08 NOTE — DISCHARGE NOTE NURSING/CASE MANAGEMENT/SOCIAL WORK - PATIENT PORTAL LINK FT
You can access the FollowMyHealth Patient Portal offered by [facility] by registering at the following website: [email or web address]. By joining [employer]’s FollowMyHealth portal, you will also be able to view your health information using other applications (apps) compatible with our system.

## 2021-03-08 NOTE — DISCHARGE NOTE PROVIDER - CARE PROVIDERS DIRECT ADDRESSES
Blurred Vision  Having blurred vision means that you cannot see things clearly. Your vision may seem fuzzy or out of focus. Blurred vision is a very common symptom of an eye or vision problem. Blurred vision is often a gradual blur that occurs in one eye or both eyes. There are many causes of blurred vision, including cataracts, macular degeneration, and diabetic retinopathy.  Blurred vision can be diagnosed based on your symptoms and a physical exam. Tell your health care provider about any other health problems you have, any recent eye injury, and any prior surgeries. You may need to see a health care provider who specializes in eye problems (ophthalmologist). Your treatment depends on what is causing your blurred vision.   HOME CARE INSTRUCTIONS  · Tell your health care provider about any changes in your blurred vision.  · Do not drive or operate heavy machinery if your vision is blurry.  · Keep all follow-up visits as directed by your health care provider. This is important.  SEEK MEDICAL CARE IF:  · Your symptoms get worse.  · You have new symptoms.  · You have trouble seeing at night.  · You have trouble seeing up close or far away.  · You have trouble noticing the difference between colors.  SEEK IMMEDIATE MEDICAL CARE IF:  · You have severe eye pain.  · You have a severe headache.  · You have flashing lights in your field of vision.  · You have a sudden change in vision.  · You have a sudden loss of vision.  · You have vision change after an injury.  · You notice drainage coming from your eyes.  · You notice a rash around your eyes.  This information is not intended to replace advice given to you by your health care provider. Make sure you discuss any questions you have with your health care provider.  Document Released: 12/20/2004 Document Revised: 05/03/2016 Document Reviewed: 11/11/2015  [employer] Interactive Patient Education © 2017 [employer]     ,DirectAddress_Unknown,DirectAddress_Unknown

## 2021-03-08 NOTE — DISCHARGE NOTE PROVIDER - CARE PROVIDER_API CALL
[facility],   Phone: (   )    -  Fax: (   )    -  Follow Up Time:     Cynthia Freire)  Medicine  [address], [address]  [place] [address]  Phone: [phone]  Fax: [phone]  Follow Up Time:

## 2021-03-15 DIAGNOSIS — Z91.010 ALLERGY TO PEANUTS: ICD-10-CM

## 2021-03-15 DIAGNOSIS — Z91.018 ALLERGY TO OTHER FOODS: ICD-10-CM

## 2021-03-15 DIAGNOSIS — K76.3 INFARCTION OF LIVER: ICD-10-CM

## 2021-03-15 DIAGNOSIS — Z90.49 ACQUIRED ABSENCE OF OTHER SPECIFIED PARTS OF DIGESTIVE TRACT: ICD-10-CM

## 2021-03-15 DIAGNOSIS — J18.9 PNEUMONIA, UNSPECIFIED ORGANISM: ICD-10-CM

## 2021-03-15 DIAGNOSIS — R33.9 RETENTION OF URINE, UNSPECIFIED: ICD-10-CM

## 2021-03-15 DIAGNOSIS — Z88.0 ALLERGY STATUS TO PENICILLIN: ICD-10-CM

## 2021-03-15 DIAGNOSIS — J96.01 ACUTE RESPIRATORY FAILURE WITH HYPOXIA: ICD-10-CM

## 2021-03-15 DIAGNOSIS — E78.5 HYPERLIPIDEMIA, UNSPECIFIED: ICD-10-CM

## 2021-03-15 DIAGNOSIS — A41.9 SEPSIS, UNSPECIFIED ORGANISM: ICD-10-CM

## 2021-03-15 DIAGNOSIS — F17.200 NICOTINE DEPENDENCE, UNSPECIFIED, UNCOMPLICATED: ICD-10-CM

## 2021-03-15 DIAGNOSIS — E44.0 MODERATE PROTEIN-CALORIE MALNUTRITION: ICD-10-CM

## 2021-03-15 DIAGNOSIS — E87.6 HYPOKALEMIA: ICD-10-CM

## 2021-03-15 DIAGNOSIS — I50.32 CHRONIC DIASTOLIC (CONGESTIVE) HEART FAILURE: ICD-10-CM

## 2021-03-15 DIAGNOSIS — K50.90 CROHN'S DISEASE, UNSPECIFIED, WITHOUT COMPLICATIONS: ICD-10-CM

## 2021-03-15 DIAGNOSIS — Z79.51 LONG TERM (CURRENT) USE OF INHALED STEROIDS: ICD-10-CM

## 2021-03-15 DIAGNOSIS — L27.0 GENERALIZED SKIN ERUPTION DUE TO DRUGS AND MEDICAMENTS TAKEN INTERNALLY: ICD-10-CM

## 2021-03-15 DIAGNOSIS — R55 SYNCOPE AND COLLAPSE: ICD-10-CM

## 2021-03-15 DIAGNOSIS — T36.0X5A ADVERSE EFFECT OF PENICILLINS, INITIAL ENCOUNTER: ICD-10-CM

## 2021-03-15 DIAGNOSIS — I11.0 HYPERTENSIVE HEART DISEASE WITH HEART FAILURE: ICD-10-CM
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
## 2021-03-15 DIAGNOSIS — J44.0 CHRONIC OBSTRUCTIVE PULMONARY DISEASE WITH (ACUTE) LOWER RESPIRATORY INFECTION: ICD-10-CM

## 2021-03-15 DIAGNOSIS — I25.10 ATHEROSCLEROTIC HEART DISEASE OF NATIVE CORONARY ARTERY WITHOUT ANGINA PECTORIS: ICD-10-CM

## 2021-03-15 DIAGNOSIS — K21.9 GASTRO-ESOPHAGEAL REFLUX DISEASE WITHOUT ESOPHAGITIS: ICD-10-CM

## 2021-03-15 DIAGNOSIS — R65.21 SEVERE SEPSIS WITH SEPTIC SHOCK: ICD-10-CM

## 2021-03-15 DIAGNOSIS — E83.39 OTHER DISORDERS OF PHOSPHORUS METABOLISM: ICD-10-CM

## 2021-09-27 NOTE — SWALLOW BEDSIDE ASSESSMENT ADULT - PHARYNGEAL PHASE
Medication being requested:LISINOPRIL 10MG TABLETS  Last Refilled:12/28/20  Last seen:4/2/21    Upcoming appt 10/4/21  
Within functional limits

## 2023-05-23 NOTE — DISCHARGE NOTE PROVIDER - NSDCCPCAREPLAN_GEN_ALL_CORE_FT
Is This A New Presentation, Or A Follow-Up?: Skin Lesion How Severe Is Your Skin Lesion?: mild Has Your Skin Lesion Been Treated?: not been treated PRINCIPAL DISCHARGE DIAGNOSIS  Diagnosis: Sepsis, due to unspecified organism, unspecified whether acute organ dysfunction present  Assessment and Plan of Treatment: Septic shock POA:  - septic shock likely due to underlying PNA  - weaned off levophed, cont with PO midodrine for BP support, wean off midodrine as BP improves  - was started on stress dose steroids as pt ?chronically on steroids for underlying crohn's/COPD; stress steroids stopped and resume home dose of steroids as listed in nursing home chart  - nocturnal BiPAP for underlying COPD which pt uses at home  Acute hypoxic respiratory failure:  - On 3L NC  - Secondary to HCAP.  HCAP:   - vanc stopped, MRSA neg  - Completed cefepime today  - OI2 supplementation  - CTA chest without PE, + R infiltrate  - Neg urine legionella   - duonebs for underlying COPD  - blood and urine cx negative to date        SECONDARY DISCHARGE DIAGNOSES  Diagnosis: Rash  Assessment and Plan of Treatment: - Likely secondary to zosyn  - Now stable    Diagnosis: HTN (hypertension)  Assessment and Plan of Treatment: - BP meds held for hypotension  - Wean off midodrine as tolerated    Diagnosis: Diastolic CHF, chronic  Assessment and Plan of Treatment: Volume status stable    Diagnosis: Crohn's disease  Assessment and Plan of Treatment: - noted with GB wall edema and pericholecystic fluid: abdominal exam benign without any tenderness or Roche's sign, HIDA negative, acute shefali ruled out  - lab values also don't really reflect GB process severe enough to cause sepsis: mild transaminitis, with initial mild bump in T bili which has normalized  - initial transaminitis likely related to component of mild ischemic injury to liver with hypotension and shock state   - SLP done, on dysphagia diet  - pt with diarrhea, may be related to underlying IBD with Crohn's, C diff neg  -  NH paper has multiple steroid regimen. can resume the chronic dose on dc .      Diagnosis: Pneumonia of right lung due to infectious organism, unspecified part of lung  Assessment and Plan of Treatment: Completed Abx

## 2023-06-19 NOTE — DIETITIAN INITIAL EVALUATION ADULT. - MALNUTRITION
moderate malnutrition in context of chronic illness Detail Level: Zone Detail Level: Generalized Detail Level: Detailed Never smoker
